# Patient Record
Sex: FEMALE | Race: WHITE
[De-identification: names, ages, dates, MRNs, and addresses within clinical notes are randomized per-mention and may not be internally consistent; named-entity substitution may affect disease eponyms.]

---

## 2020-02-14 ENCOUNTER — HOSPITAL ENCOUNTER (OUTPATIENT)
Dept: HOSPITAL 96 - M.WC | Age: 60
End: 2020-02-14
Attending: FAMILY MEDICINE
Payer: COMMERCIAL

## 2020-02-14 DIAGNOSIS — L98.412: ICD-10-CM

## 2020-02-14 DIAGNOSIS — L89.323: ICD-10-CM

## 2020-02-14 DIAGNOSIS — E66.01: ICD-10-CM

## 2020-02-14 DIAGNOSIS — E11.40: ICD-10-CM

## 2020-02-14 DIAGNOSIS — L97.422: ICD-10-CM

## 2020-02-14 DIAGNOSIS — L89.623: ICD-10-CM

## 2020-02-14 DIAGNOSIS — I10: ICD-10-CM

## 2020-02-14 DIAGNOSIS — K21.9: ICD-10-CM

## 2020-02-14 DIAGNOSIS — F32.9: ICD-10-CM

## 2020-02-14 DIAGNOSIS — E11.621: Primary | ICD-10-CM

## 2020-02-14 DIAGNOSIS — Z87.891: ICD-10-CM

## 2020-02-14 DIAGNOSIS — G47.00: ICD-10-CM

## 2020-02-27 ENCOUNTER — HOSPITAL ENCOUNTER (OUTPATIENT)
Dept: HOSPITAL 96 - M.WC | Age: 60
End: 2020-02-27
Attending: FAMILY MEDICINE
Payer: COMMERCIAL

## 2020-02-27 DIAGNOSIS — E11.622: ICD-10-CM

## 2020-02-27 DIAGNOSIS — L98.412: ICD-10-CM

## 2020-02-27 DIAGNOSIS — L89.623: ICD-10-CM

## 2020-02-27 DIAGNOSIS — E66.01: ICD-10-CM

## 2020-02-27 DIAGNOSIS — L89.323: ICD-10-CM

## 2020-02-27 DIAGNOSIS — E11.621: Primary | ICD-10-CM

## 2020-02-27 DIAGNOSIS — E11.40: ICD-10-CM

## 2020-02-27 DIAGNOSIS — K21.9: ICD-10-CM

## 2020-02-27 DIAGNOSIS — Z87.891: ICD-10-CM

## 2020-02-27 DIAGNOSIS — L97.421: ICD-10-CM

## 2020-02-27 DIAGNOSIS — I10: ICD-10-CM

## 2020-02-27 DIAGNOSIS — F32.9: ICD-10-CM

## 2020-02-27 DIAGNOSIS — G47.00: ICD-10-CM

## 2020-03-12 ENCOUNTER — HOSPITAL ENCOUNTER (OUTPATIENT)
Dept: HOSPITAL 96 - M.WC | Age: 60
End: 2020-03-12
Attending: FAMILY MEDICINE
Payer: COMMERCIAL

## 2020-03-12 DIAGNOSIS — K21.9: ICD-10-CM

## 2020-03-12 DIAGNOSIS — L89.623: ICD-10-CM

## 2020-03-12 DIAGNOSIS — L98.412: ICD-10-CM

## 2020-03-12 DIAGNOSIS — E11.622: ICD-10-CM

## 2020-03-12 DIAGNOSIS — E11.621: Primary | ICD-10-CM

## 2020-03-12 DIAGNOSIS — I10: ICD-10-CM

## 2020-03-12 DIAGNOSIS — E11.40: ICD-10-CM

## 2020-03-12 DIAGNOSIS — E66.01: ICD-10-CM

## 2020-03-12 DIAGNOSIS — G47.00: ICD-10-CM

## 2020-03-12 DIAGNOSIS — F32.9: ICD-10-CM

## 2020-03-12 DIAGNOSIS — L89.323: ICD-10-CM

## 2020-03-12 DIAGNOSIS — L97.422: ICD-10-CM

## 2020-03-26 ENCOUNTER — HOSPITAL ENCOUNTER (OUTPATIENT)
Dept: HOSPITAL 96 - M.WC | Age: 60
End: 2020-03-26
Attending: FAMILY MEDICINE
Payer: COMMERCIAL

## 2020-03-26 DIAGNOSIS — L97.422: ICD-10-CM

## 2020-03-26 DIAGNOSIS — L89.623: ICD-10-CM

## 2020-03-26 DIAGNOSIS — L98.412: ICD-10-CM

## 2020-03-26 DIAGNOSIS — E11.40: ICD-10-CM

## 2020-03-26 DIAGNOSIS — E11.621: Primary | ICD-10-CM

## 2020-03-26 DIAGNOSIS — I10: ICD-10-CM

## 2020-03-26 DIAGNOSIS — K21.9: ICD-10-CM

## 2020-03-26 DIAGNOSIS — E66.01: ICD-10-CM

## 2020-03-26 DIAGNOSIS — Z87.891: ICD-10-CM

## 2020-03-26 DIAGNOSIS — F32.9: ICD-10-CM

## 2020-03-26 DIAGNOSIS — L89.323: ICD-10-CM

## 2020-03-26 DIAGNOSIS — E11.622: ICD-10-CM

## 2020-03-26 DIAGNOSIS — G47.00: ICD-10-CM

## 2020-04-16 ENCOUNTER — HOSPITAL ENCOUNTER (OUTPATIENT)
Dept: HOSPITAL 96 - M.WC | Age: 60
End: 2020-04-16
Attending: FAMILY MEDICINE
Payer: COMMERCIAL

## 2020-04-16 DIAGNOSIS — F32.9: ICD-10-CM

## 2020-04-16 DIAGNOSIS — E11.40: ICD-10-CM

## 2020-04-16 DIAGNOSIS — L98.411: ICD-10-CM

## 2020-04-16 DIAGNOSIS — K21.9: ICD-10-CM

## 2020-04-16 DIAGNOSIS — L89.323: ICD-10-CM

## 2020-04-16 DIAGNOSIS — Z87.891: ICD-10-CM

## 2020-04-16 DIAGNOSIS — E11.622: ICD-10-CM

## 2020-04-16 DIAGNOSIS — E66.01: ICD-10-CM

## 2020-04-16 DIAGNOSIS — I10: ICD-10-CM

## 2020-04-16 DIAGNOSIS — G47.00: ICD-10-CM

## 2020-04-16 DIAGNOSIS — L97.422: ICD-10-CM

## 2020-04-16 DIAGNOSIS — E11.621: Primary | ICD-10-CM

## 2020-04-16 DIAGNOSIS — L89.623: ICD-10-CM

## 2020-04-16 LAB
ANION GAP SERPL CALC-SCNC: 7 MMOL/L (ref 7–16)
BUN SERPL-MCNC: 27 MG/DL (ref 7–18)
CALCIUM SERPL-MCNC: 8.4 MG/DL (ref 8.5–10.1)
CHLORIDE SERPL-SCNC: 103 MMOL/L (ref 98–107)
CO2 SERPL-SCNC: 31 MMOL/L (ref 21–32)
CREAT SERPL-MCNC: 1 MG/DL (ref 0.6–1.3)
GLUCOSE SERPL-MCNC: 99 MG/DL (ref 70–99)
POTASSIUM SERPL-SCNC: 4.1 MMOL/L (ref 3.5–5.1)
SODIUM SERPL-SCNC: 141 MMOL/L (ref 136–145)

## 2020-04-22 VITALS — DIASTOLIC BLOOD PRESSURE: 52 MMHG | SYSTOLIC BLOOD PRESSURE: 100 MMHG

## 2020-04-23 ENCOUNTER — HOSPITAL ENCOUNTER (INPATIENT)
Dept: HOSPITAL 96 - M.ERS | Age: 60
LOS: 4 days | Discharge: HOME HEALTH | DRG: 871 | End: 2020-04-27
Attending: INTERNAL MEDICINE | Admitting: INTERNAL MEDICINE
Payer: COMMERCIAL

## 2020-04-23 VITALS — WEIGHT: 293 LBS | HEIGHT: 64.02 IN | BODY MASS INDEX: 50.02 KG/M2

## 2020-04-23 VITALS — SYSTOLIC BLOOD PRESSURE: 112 MMHG | DIASTOLIC BLOOD PRESSURE: 49 MMHG

## 2020-04-23 VITALS — SYSTOLIC BLOOD PRESSURE: 135 MMHG | DIASTOLIC BLOOD PRESSURE: 48 MMHG

## 2020-04-23 VITALS — SYSTOLIC BLOOD PRESSURE: 109 MMHG | DIASTOLIC BLOOD PRESSURE: 41 MMHG

## 2020-04-23 VITALS — SYSTOLIC BLOOD PRESSURE: 100 MMHG | DIASTOLIC BLOOD PRESSURE: 52 MMHG

## 2020-04-23 DIAGNOSIS — J96.01: ICD-10-CM

## 2020-04-23 DIAGNOSIS — R65.21: ICD-10-CM

## 2020-04-23 DIAGNOSIS — Z20.828: ICD-10-CM

## 2020-04-23 DIAGNOSIS — I87.8: ICD-10-CM

## 2020-04-23 DIAGNOSIS — Z79.84: ICD-10-CM

## 2020-04-23 DIAGNOSIS — J18.9: ICD-10-CM

## 2020-04-23 DIAGNOSIS — R59.1: ICD-10-CM

## 2020-04-23 DIAGNOSIS — L97.929: ICD-10-CM

## 2020-04-23 DIAGNOSIS — A40.0: Primary | ICD-10-CM

## 2020-04-23 DIAGNOSIS — E87.1: ICD-10-CM

## 2020-04-23 DIAGNOSIS — E86.0: ICD-10-CM

## 2020-04-23 DIAGNOSIS — K80.20: ICD-10-CM

## 2020-04-23 DIAGNOSIS — I10: ICD-10-CM

## 2020-04-23 DIAGNOSIS — F41.9: ICD-10-CM

## 2020-04-23 DIAGNOSIS — N17.0: ICD-10-CM

## 2020-04-23 DIAGNOSIS — Z79.4: ICD-10-CM

## 2020-04-23 DIAGNOSIS — L03.116: ICD-10-CM

## 2020-04-23 DIAGNOSIS — G92: ICD-10-CM

## 2020-04-23 DIAGNOSIS — E46: ICD-10-CM

## 2020-04-23 DIAGNOSIS — E87.8: ICD-10-CM

## 2020-04-23 DIAGNOSIS — F17.210: ICD-10-CM

## 2020-04-23 DIAGNOSIS — E11.65: ICD-10-CM

## 2020-04-23 DIAGNOSIS — E11.51: ICD-10-CM

## 2020-04-23 DIAGNOSIS — E55.9: ICD-10-CM

## 2020-04-23 DIAGNOSIS — E66.01: ICD-10-CM

## 2020-04-23 DIAGNOSIS — Z88.5: ICD-10-CM

## 2020-04-23 LAB
ABSOLUTE MONOCYTES: 0.4 THOU/UL (ref 0–1.2)
ALBUMIN SERPL-MCNC: 2.9 G/DL (ref 3.4–5)
ALP SERPL-CCNC: 110 U/L (ref 46–116)
ALT SERPL-CCNC: 33 U/L (ref 30–65)
ANION GAP SERPL CALC-SCNC: 6 MMOL/L (ref 7–16)
APTT BLD: 33.4 SECONDS (ref 25–31.3)
AST SERPL-CCNC: 30 U/L (ref 15–37)
BACTERIA-REFLEX: (no result) /HPF
BE: -1.8 MMOL/L
BILIRUB SERPL-MCNC: 0.5 MG/DL
BILIRUB UR-MCNC: NEGATIVE MG/DL
BUN SERPL-MCNC: 43 MG/DL (ref 7–18)
CALCIUM SERPL-MCNC: 8.9 MG/DL (ref 8.5–10.1)
CHLORIDE SERPL-SCNC: 96 MMOL/L (ref 98–107)
CO2 SERPL-SCNC: 30 MMOL/L (ref 21–32)
COLOR UR: YELLOW
CREAT SERPL-MCNC: 1.7 MG/DL (ref 0.6–1.3)
GLUCOSE SERPL-MCNC: 114 MG/DL (ref 70–99)
GRANULOCYTES NFR BLD MANUAL: 89 %
HCT VFR BLD CALC: 31.4 % (ref 37–47)
HGB BLD-MCNC: 9.9 GM/DL (ref 12–15)
INR PPP: 1.1
KETONES UR STRIP-MCNC: NEGATIVE MG/DL
LYMPHOCYTES # BLD: 2.2 THOU/UL (ref 0.8–5.3)
LYMPHOCYTES NFR BLD AUTO: 6 %
MCH RBC QN AUTO: 23.4 PG (ref 26–34)
MCHC RBC AUTO-ENTMCNC: 31.5 G/DL (ref 28–37)
MCV RBC: 74.4 FL (ref 80–100)
MONOCYTES NFR BLD: 1 %
MPV: 7.9 FL. (ref 7.2–11.1)
MUCUS: (no result) STRN/LPF
NEUTROPHILS # BLD: 34.2 THOU/UL (ref 1.6–8.1)
NEUTS BAND NFR BLD: 4 %
NUCLEATED RBCS: 0 /100WBC
PCO2 BLD: 43.7 MMHG (ref 35–45)
PLATELET # BLD EST: ADEQUATE 10*3/UL
PLATELET COUNT*: 307 THOU/UL (ref 150–400)
PO2 BLD: 107.5 MMHG (ref 75–100)
POTASSIUM SERPL-SCNC: 4.6 MMOL/L (ref 3.5–5.1)
PROT SERPL-MCNC: 8.1 G/DL (ref 6.4–8.2)
PROT UR QL STRIP: (no result)
PROTHROMBIN TIME: 11.4 SECONDS (ref 9.2–11.5)
RBC # BLD AUTO: 4.22 MIL/UL (ref 4.2–5)
RBC # UR STRIP: (no result) /UL
RBC #/AREA URNS HPF: (no result) /HPF (ref 0–2)
RBC MORPH BLD: NORMAL
RDW-CV: 18.8 % (ref 10.5–14.5)
SODIUM SERPL-SCNC: 132 MMOL/L (ref 136–145)
SP GR UR STRIP: 1.01 (ref 1–1.03)
SQUAMOUS: (no result) /LPF (ref 0–3)
URINE CHLORIDE-RANDOM*: 82 MMOL/L
URINE CLARITY: CLEAR
URINE GLUCOSE-RANDOM: NEGATIVE
URINE LEUKOCYTES-REFLEX: NEGATIVE
URINE NITRITE-REFLEX: NEGATIVE
URINE POTASSIUM-RANDOM: 93.7 MMOL/L
URINE WBC-REFLEX: (no result) /HPF (ref 0–5)
UROBILINOGEN UR STRIP-ACNC: 0.2 E.U./DL (ref 0.2–1)
WBC # BLD AUTO: 36.8 THOU/UL (ref 4–11)

## 2020-04-23 PROCEDURE — 02HV33Z INSERTION OF INFUSION DEVICE INTO SUPERIOR VENA CAVA, PERCUTANEOUS APPROACH: ICD-10-PCS | Performed by: INTERNAL MEDICINE

## 2020-04-23 NOTE — NUR
WOUND NURSE:  PATIENT SEEN FOR INITIAL ASSESSMENT IN ACUTE SETTING TO ADDRESS
LEFT HIP STAGE 4 PRESSURE INJURY THAT HAD SUBSEQUENTLY BEEN TREATED IN [facility]  TODAY THE WOUND MEASURESW 2.5 X 7.5 X 9.0  CM AND GOES TO BONE.  WOUND
PRESENTS WITH SLIMY PINK DRAINAGE IN LARGE AMOUNTS, PERIWOUND REDNESS AND
MACERATION.  TENDER TO TOUCH.  PATIETN WAS PREVIOUSLY USING WOUND VAC, BUT
DRESSING IS NOT INTACT.  CLEANSED WITH SOAP AND WATER, RINSED, PATTED DRY.
APPLIED PHYTOPLEX AF MOISTURE BARRIER TO EXCORIATED AND MACERATED TISSUE.
LIGHTLY PACKED WOUND TO IT'S DEPTH USING AQUACEL AG, COVERED WITH ADDITIONAL
AQUACEL AG UNDER ABD'S, THEN SECURED WITH TAPE.  LEFT HEEL STAGE 3 PRESSURE
INJURY MEASURES 2.5 X 2.5 X 1.0 CM AND CONTAINS PINK GRANULATION TISSUE WITH
MODERATE AMOUNT OF SEROUSANGUINOUS DRAINAGE.  PATIENT NEEDS LOW AIRLOSS
BARIATRIC MATTRESS AND NURSINGG SUPERVISOR TO OBTAIN THIS FOR PATIENT.  LEFT
HEEL WOUND WAS CLEANSED WITH SOAP AND WATER, RINSED, THEN PATTED DRY.  APPLIED
AQUACEL AG UNDER ABD, THEN WRAPPED WITH KERLEX ROLL GAUZE AND SECURED WITH
TAPE.  HEELS PLACED OFF MATTRESS USING PILLOWS.  PATIENT IS NOT TEACHEABLE AT
THIS TIME.

## 2020-04-23 NOTE — EKG
[place] [address]
Phone:  [phone]                     ELECTROCARDIOGRAM REPORT      
_______________________________________________________________________________
 
Name:         GRISELDA COON            Room:          [place] [address]   ADM IN 
..#:    [id number]     Account #:     [id number]  
Admission:    20    Attend Phys:   Dino Noland, 
Discharge:                Date of Birth: [birth year]  
Date of Service: 20 1019  Report #:      [id number]
        [id number]
_______________________________________________________________________________
THIS REPORT FOR:  //name//                      
 
                         [facility] ED
                                       
Test Date:    2020               Test Time:    10:19:40
Pat Name:     GRISELDA COON         Department:   
Patient ID:   [id number]            Room:         [facility]
Gender:       F                        Technician:   TS
:          1960               Requested By: Darien Oden
Order Number: [id number] MD:   Kyle Goldstein
                                 Measurements
Intervals                              Axis          
Rate:         108                      P:            0
OR:           47                       QRS:          -64
QRSD:         116                      T:            
QT:           323                                    
QTc:          433                                    
                           Interpretive Statements
Sinus tachycardia
Incomplete right bundle branch block
Inferior infarct, old
artifact noted
Compared to ECG 2019 00:55:22
 
 
 
Atrial premature complex(es) no longer present
 
Electronically Signed On 2020 13:31:49 CDT by Kyle Goldstein
[email or web address]
 
 
 
 
 
 
 
 
 
 
 
 
 
 
  <ELECTRONICALLY SIGNED>
                                           By: Kyle Goldstein MD, FACC      
  20     1331
D: 20 1019   _____________________________________
T: 20 1019   Kyle Goldstein MD, FAC        /EPI

## 2020-04-23 NOTE — NUR
AT 1200, ASSESSED HER WOUND ON COCCYX AREA; HAS A WOUND VAC IN PLACE BUT THE
SEAL IS BROKEN AROUND THE WOUND AND WOUND VAC IS NOT PROPERLY WORKING. NOTIFIED
DR WADDELL ABOUT WOUND AND WOUND VAC NOT WORKING. WOUND STAGE 2 WITH YELLOW
DRAINAGE NOTED. PT'T SEMAJ AREA IS RED BUT SKIN IS INTACT WITH YEAST PRESENT 
UNDERNEATH PANNUS

## 2020-04-23 NOTE — NUR
CALLED TO ER TO PLACE PICC FOR PT IN NEED OF MULTIPLE ANTIBIOTIC WITH RESP
FAILURE AND POSS COVID 19. SPOKE WITH REVIEW OF RISK AND BENEFITS COMPLETED.
PT VOICED UNDERSTANDING AND AGREED. CONSENT OBTAINED. TRIPLE LUMAN POWER PICC
PLACED TO RIGHT UPPER CEPHALIC. USING ULTRASOUND, MODIFIED SELDINGER TECHNIQUE
INCLUDING MAX BARRIR PRECAUTIONS. GOOD BRISK BLOOD RETURN WITH ASPERATION AND
EASY FLUSH. TOLERATED WELL. UNABLE TO USE SHERLOCK 3CG RELATED TO EQUIPMENT.
CHEST X-RAY OBTAINED AND PER RADIOLOGIST LINE IS APROX.  5CM DEEP. PRIMARY
NURSE RETRACTED LINE 4CM UNDER MY DIRECTION LIMITING ISOLATION
CONTACT/PRECAUTIONS.  NO NEW X-RAY REUQIRED AT THIS TIME. LINE RELEASED FOR
USE.

## 2020-04-24 VITALS — SYSTOLIC BLOOD PRESSURE: 101 MMHG | DIASTOLIC BLOOD PRESSURE: 60 MMHG

## 2020-04-24 VITALS — DIASTOLIC BLOOD PRESSURE: 56 MMHG | SYSTOLIC BLOOD PRESSURE: 113 MMHG

## 2020-04-24 VITALS — SYSTOLIC BLOOD PRESSURE: 107 MMHG | DIASTOLIC BLOOD PRESSURE: 52 MMHG

## 2020-04-24 VITALS — DIASTOLIC BLOOD PRESSURE: 50 MMHG | SYSTOLIC BLOOD PRESSURE: 88 MMHG

## 2020-04-24 VITALS — DIASTOLIC BLOOD PRESSURE: 56 MMHG | SYSTOLIC BLOOD PRESSURE: 96 MMHG

## 2020-04-24 VITALS — SYSTOLIC BLOOD PRESSURE: 109 MMHG | DIASTOLIC BLOOD PRESSURE: 65 MMHG

## 2020-04-24 LAB
ALBUMIN SERPL-MCNC: 2.5 G/DL (ref 3.4–5)
ALP SERPL-CCNC: 91 U/L (ref 46–116)
ALT SERPL-CCNC: 30 U/L (ref 30–65)
ANION GAP SERPL CALC-SCNC: 5 MMOL/L (ref 7–16)
AST SERPL-CCNC: 23 U/L (ref 15–37)
BILIRUB SERPL-MCNC: 0.4 MG/DL
BUN SERPL-MCNC: 45 MG/DL (ref 7–18)
CALCIUM SERPL-MCNC: 8.1 MG/DL (ref 8.5–10.1)
CHLORIDE SERPL-SCNC: 101 MMOL/L (ref 98–107)
CO2 SERPL-SCNC: 29 MMOL/L (ref 21–32)
CREAT SERPL-MCNC: 1.4 MG/DL (ref 0.6–1.3)
GLUCOSE SERPL-MCNC: 157 MG/DL (ref 70–99)
HCT VFR BLD CALC: 29 % (ref 37–47)
HGB BLD-MCNC: 9.3 GM/DL (ref 12–15)
MAGNESIUM SERPL-MCNC: 2.1 MG/DL (ref 1.8–2.4)
MCH RBC QN AUTO: 24.1 PG (ref 26–34)
MCHC RBC AUTO-ENTMCNC: 32.1 G/DL (ref 28–37)
MCV RBC: 75 FL (ref 80–100)
MPV: 7.6 FL. (ref 7.2–11.1)
PLATELET COUNT*: 249 THOU/UL (ref 150–400)
POTASSIUM SERPL-SCNC: 4.4 MMOL/L (ref 3.5–5.1)
PROT SERPL-MCNC: 7.3 G/DL (ref 6.4–8.2)
RBC # BLD AUTO: 3.87 MIL/UL (ref 4.2–5)
RDW-CV: 18.5 % (ref 10.5–14.5)
SODIUM SERPL-SCNC: 135 MMOL/L (ref 136–145)
WBC # BLD AUTO: 29.1 THOU/UL (ref 4–11)

## 2020-04-24 NOTE — NUR
SW called pt room and spoke with pt to complete initial assessment.  Pt
talkative.  Pt explains that she has been at home after dc from [facility]
in rehab facility.  Pt current with [facility] and goes to wound clinic
every other Thursday.  HH nurse visited pt 3 times a week.  Pt says she lives
in apt with her dtr who provides needed assistance and her  still works
outside the home the majority of the time but comes home every once in a while
to give pt dtr a break from caregiving.  pt has RW and cam boot due to drop
foot.  Pt says she ambulates but slowly and still thinks she should look for
wc for outside the home.  Pt insurance does not have benefits for LTAC or SNF.
SW discussed dc plan for home with dtr and HH to resume care. Pt thought she
would stay a couple weeks bc of iv abx but SW explained that would most likely
not need to be the case and if needed, IV abx could be arranged for home and
pt expressed understanding.  SW to continue to follow to assist with safe
dc planning.

## 2020-04-24 NOTE — EKG
[place] [address]
Phone:  [phone]                     ELECTROCARDIOGRAM REPORT      
_______________________________________________________________________________
 
Name:         GRISELDA COON            Room:          [address]    ADM IN 
M.R.#:    [id number]     Account #:     [id number]  
Admission:    20    Attend Phys:   Dino Noland, 
Discharge:                Date of Birth: [birth year]  
Date of Service: 20 0307  Report #:      [id number]
        [id number]
_______________________________________________________________________________
THIS REPORT FOR:  //name//                      
 
                          [facility]
                                       
Test Date:    2020               Test Time:    03:07:49
Pat Name:     GRISELDA COON         Department:   
Patient ID:   [id number]            Room:         [address]
Gender:       F                        Technician:   MS
:          1960               Requested By: Rosana Smith
Order Number: [id number]    Reading MD:   Kyle Goldstein
                                 Measurements
Intervals                              Axis          
Rate:         92                       P:            55
AK:           156                      QRS:          70
QRSD:         96                       T:            33
QT:           361                                    
QTc:          447                                    
                           Interpretive Statements
Sinus rhythm
incomplete RBBB
Probable left atrial enlargement
Baseline wander in lead(s) V6
Compared to ECG 2020 10:19:40
Sinus tachycardia no longer present
 
 
Electronically Signed On 2020 10:55:59 CDT by Kyle Goldstein
[email or web address]
 
 
 
 
 
 
 
 
 
 
 
 
 
 
 
 
  <ELECTRONICALLY SIGNED>
                                           By: Kyle Goldstein MD, [facility]      
  20     1055
D: 207   _____________________________________
T: 207   Kyle Goldstein MD, [facility]        /EPI

## 2020-04-24 NOTE — NUR
ASSUMED PATIENT CARE AT 1900. ASSESSMENT COMPLETED AS CHARTED. PATIENT IS NSR
ON THE MONITOR. HOURLY ROUNDING IN PLACE FOR PATIENT SAFETY. DURING SHIFT
PATIENT REPORTED CHEST PAIN DURING INSPIRATION. EKG PERFORMED, SEE CHART FOR
DETAILS. PATIENT DECLINED PRN PAIN MEDS. DURING SHIFT PATIENT ACCIDENTALLY
DETACHED ONE OF THE PORTS FROM HER PICC LINE AND BLEEDING CAUSED IT TO CLOT
OFF. THIS NURSE WAS CONCERNED THAT SHE HAD ALSO POTENTIALLY DISLODGED HER PICC
LINE. FLUIDS PAUSED, PHYSICIAN WAS NOTIFIED, NEW ORDERS RECEIVED, SEE PATIENT
CHART FOR DETAILS. AWAITING CATH RAMSES, REPORT GIVEN TO DAY-SHIFT NURSE. CLWR.

## 2020-04-25 VITALS — DIASTOLIC BLOOD PRESSURE: 62 MMHG | SYSTOLIC BLOOD PRESSURE: 114 MMHG

## 2020-04-25 VITALS — DIASTOLIC BLOOD PRESSURE: 60 MMHG | SYSTOLIC BLOOD PRESSURE: 110 MMHG

## 2020-04-25 VITALS — DIASTOLIC BLOOD PRESSURE: 62 MMHG | SYSTOLIC BLOOD PRESSURE: 115 MMHG

## 2020-04-25 VITALS — DIASTOLIC BLOOD PRESSURE: 45 MMHG | SYSTOLIC BLOOD PRESSURE: 100 MMHG

## 2020-04-25 VITALS — DIASTOLIC BLOOD PRESSURE: 65 MMHG | SYSTOLIC BLOOD PRESSURE: 118 MMHG

## 2020-04-25 VITALS — SYSTOLIC BLOOD PRESSURE: 105 MMHG | DIASTOLIC BLOOD PRESSURE: 48 MMHG

## 2020-04-25 VITALS — DIASTOLIC BLOOD PRESSURE: 54 MMHG | SYSTOLIC BLOOD PRESSURE: 120 MMHG

## 2020-04-25 LAB
ANION GAP SERPL CALC-SCNC: 6 MMOL/L (ref 7–16)
BUN SERPL-MCNC: 37 MG/DL (ref 7–18)
CALCIUM SERPL-MCNC: 7.9 MG/DL (ref 8.5–10.1)
CHLORIDE SERPL-SCNC: 104 MMOL/L (ref 98–107)
CO2 SERPL-SCNC: 29 MMOL/L (ref 21–32)
CREAT SERPL-MCNC: 1.1 MG/DL (ref 0.6–1.3)
GLUCOSE SERPL-MCNC: 98 MG/DL (ref 70–99)
HCT VFR BLD CALC: 28.1 % (ref 37–47)
HGB BLD-MCNC: 8.9 GM/DL (ref 12–15)
MAGNESIUM SERPL-MCNC: 2.2 MG/DL (ref 1.8–2.4)
MCH RBC QN AUTO: 23.6 PG (ref 26–34)
MCHC RBC AUTO-ENTMCNC: 31.6 G/DL (ref 28–37)
MCV RBC: 74.8 FL (ref 80–100)
MPV: 7.6 FL. (ref 7.2–11.1)
PLATELET COUNT*: 204 THOU/UL (ref 150–400)
POTASSIUM SERPL-SCNC: 4.4 MMOL/L (ref 3.5–5.1)
RBC # BLD AUTO: 3.75 MIL/UL (ref 4.2–5)
RDW-CV: 18.2 % (ref 10.5–14.5)
SODIUM SERPL-SCNC: 139 MMOL/L (ref 136–145)
WBC # BLD AUTO: 18 THOU/UL (ref 4–11)

## 2020-04-25 NOTE — NUR
ASSUMED PATIENT CARE AT 1900. ASSESSMENT COMPLETED AS CHARTED. PATIENT IS NSR
ON THE MONITOR. HOURLY ROUNDING IN PLACE FOR PATIENT SAFETY. CLWR.

## 2020-04-25 NOTE — NUR
ASSUMED PT CARE AT 0700, PT A&O X4, VSS, REMAINS ON 3LPM VIA NC, ENCOURAGED TO
GET OUT OF BED AND SIT IN RECLINER, PT REFUSED. MED SURG STATUS, LS DIMINISHED
THROUGHOUT. AIKEN PATENT AND DRAINING DARK YELLOW URINE, WOUND DRESSINGS
CHANGED, CLEAN, DRY, AND INTACT. PICC LINE INTACT AND EACH LUMEN PATENT, PT
REMAINS ON CONTACT ISOLATION FOR HX OF MRSA IN WOUNDS, HOURLY ROUNDING
COMPLETED.

## 2020-04-26 VITALS — DIASTOLIC BLOOD PRESSURE: 64 MMHG | SYSTOLIC BLOOD PRESSURE: 112 MMHG

## 2020-04-26 VITALS — DIASTOLIC BLOOD PRESSURE: 96 MMHG | SYSTOLIC BLOOD PRESSURE: 125 MMHG

## 2020-04-26 VITALS — SYSTOLIC BLOOD PRESSURE: 118 MMHG | DIASTOLIC BLOOD PRESSURE: 61 MMHG

## 2020-04-26 LAB
ANION GAP SERPL CALC-SCNC: 6 MMOL/L (ref 7–16)
BUN SERPL-MCNC: 26 MG/DL (ref 7–18)
CALCIUM SERPL-MCNC: 8 MG/DL (ref 8.5–10.1)
CHLORIDE SERPL-SCNC: 105 MMOL/L (ref 98–107)
CO2 SERPL-SCNC: 31 MMOL/L (ref 21–32)
CREAT SERPL-MCNC: 0.9 MG/DL (ref 0.6–1.3)
GLUCOSE SERPL-MCNC: 101 MG/DL (ref 70–99)
MAGNESIUM SERPL-MCNC: 1.9 MG/DL (ref 1.8–2.4)
POTASSIUM SERPL-SCNC: 4.7 MMOL/L (ref 3.5–5.1)
SODIUM SERPL-SCNC: 142 MMOL/L (ref 136–145)

## 2020-04-26 NOTE — NUR
PT IS ABLE TO COMMUNCATE HER NEEDS TO STAFF EFFECTIVELY. CURRENT PAIN
MEDICATION REGIMEN HAS BEEN ADEQUATE FOR CONTROLLING HER PAIN UP TO THIS TIME.
PT'S HEELS ELEVATED OFF BED DURING THE NIGHT. DRESSINGS TO WOUNDS INTACT AND
REINFORCED WHERE NEEDED. AIKEN PATENT UP TO THIS TIME; GOOD OUTPUT. CONTACT
ISOLATION FOR MRSA MAINTAINED. PICC LINE PATENT.

## 2020-04-26 NOTE — NUR
ASSUMED PT CARE AT 0700, VSS, REMAINS ON 2.5LPM VIA NC, TITRATED DOWN FROM
3LPM, REMAINS MED/SURG STATUS. AIKEN REMOVED PER DR ORDERS, PT EDUCATED ON
IMPORTANCE OF REMOVAL, STATES UNDERSTANDING AND ABLE TO VOID WITH NO
DIFFICULTIES POST CATH. TRIPLE LUMEN PICC PATENT, DRESSING CLEAN, DRY, AND
INTACT, HOURLY ROUNDING COMPLETED.

## 2020-04-27 VITALS — SYSTOLIC BLOOD PRESSURE: 134 MMHG | DIASTOLIC BLOOD PRESSURE: 71 MMHG

## 2020-04-27 VITALS — DIASTOLIC BLOOD PRESSURE: 71 MMHG | SYSTOLIC BLOOD PRESSURE: 143 MMHG

## 2020-04-27 VITALS — SYSTOLIC BLOOD PRESSURE: 143 MMHG | DIASTOLIC BLOOD PRESSURE: 71 MMHG

## 2020-04-27 NOTE — CON
[address]  [address]                    CONSULTATION                  
_______________________________________________________________________________
 
Name:       JAYMIEGRISELDA M             Room:           [address] [place]  
M.R.#:  [id number]      Account #:      [id number]  
Admission:  04/23/20     Attend Phys:    Dino Noland MD 
Discharge:               Date of Birth:  [birth year]  
         Report #: [id number]
                                                                     [id number]  
_______________________________________________________________________________
THIS REPORT FOR:  //name//                      
 
cc:  Quentin Rice Russell J. DO                                               ~
THIS REPORT FOR:  //name//                      
 
CC: Dino Rice
 
DATE OF SERVICE:  04/24/2020
 
 
INFECTIOUS DISEASE CONSULTATION
 
ATTENDING PHYSICIAN:  Dino Noland MD
 
REASON FOR EVALUATION:  Gram-positive cocci, septicemia.
 
HISTORY OF PRESENT ILLNESS:  Chart reviewed, the patient examined.  This is a
59-year-old known to myself, who had actually been hospitalized in 11/2019, was
diagnosed with Staphylococcus aureus septicemia, also had wounds with
polymicrobial growth including Enterococcus faecalis and MRSA.  [facility], has multiple ulcers involving her left lower
extremity, perhaps most problematic involving the proximal aspect noted that had
worsened over the course of recent days to weeks, increasing inflammation.  She
was evaluated and underwent a culture in mid February which showed no growth. 
Had undergone an MRI evaluation earlier this week.  Had presented with onset of
fevers, which were high-grade as well as encephalopathy.  Upon evaluation, blood
cultures were collected 1 out of 2 with growth of gram-positive cocci, has a
skin and soft tissue infection cultures as well that are in progress.  Chest
x-ray did show some diffuse bilateral interstitial infiltrates.  A CT abdomen
and pelvis shows some pelvic adenopathy.  Had a marked elevation of her white
count of 36,800.  Urinalysis was otherwise unremarkable.  Coronavirus PCR was
negative.  She was initiated on empiric therapy with vancomycin as well as
levofloxacin and ceftriaxone.
 
ALLERGIES:  LISTED TO CODEINE.
 
CURRENT MEDICATIONS:  Include vancomycin, levofloxacin, gabapentin,
pantoprazole, bupropion, ceftriaxone, p.r.n. analgesics and antiemetics.
 
PAST MEDICAL HISTORY:  Diabetes mellitus type 2, history of anxiety, lower
extremity lymphedema, chronic decubitus ulcers of the left ischial site, left
adrenal adenoma and morbid obesity.
 
SOCIAL HISTORY:  She smokes last 40 years.  No ethanol.  No illicit drug use.
 
 
 
[place] [address]                    CONSULTATION                  
_______________________________________________________________________________
 
Name:       RUTH ANN COONTORRES CARDENAS             Room:           [address]#:  [id number]      Account #:      [id number]  
Admission:  04/23/20     Attend Phys:    Dino Noland MD 
Discharge:               Date of Birth:  [birth year]  
         Report #: [id number]
                                                                     [id number]  
_______________________________________________________________________________
 
FAMILY HISTORY:  Noncontributory.
 
REVIEW OF SYSTEMS:  Denies any significant gastrointestinal-related complaints. 
She is on supplemental oxygen, although not normally, so she is generally lucid.
 
PHYSICAL EXAMINATION:
GENERAL:  She is alert, cooperative, appropriate.  She is in mild-to-moderate
distress.  She is morbidly obese.
VITAL SIGNS:  T-max 103.2 yesterday, more recently 97.9, pulse 93, respirations
22, blood pressure is 88/50 and saturation 98%.
SKIN:  Warm, dry, no rashes.
HEENT:  Normocephalic.  Extraocular muscles intact.
NECK:  Supple.
LUNGS:  Few scattered coarse breath sounds.
HEART:  Regular.  Borderline tachycardic.  I do not appreciate murmur.
ABDOMEN:  Obese, soft, nontender.  Left ischial site has moderate inflammatory
changes.  No apparent odor.  There is some drainage from the site.
EXTREMITIES:  Distal lower extremity has a dressing in place.
 AND RECTAL:  Deferred.
 
LABORATORY DATA:  Lactic acid initially 1.9, repeat this morning was 1.0. 
Electrolytes this morning; sodium 135, potassium 4.4, chloride 101, bicarbonate
is 29, anion gap of 5, BUN and creatinine of 45 and 1.4, and glucose of 157. 
LFTs unremarkable.  Albumin of 285, total protein 7.3, estimated GFR of 39. 
CBC:  White count of 29.1, hemoglobin and hematocrit of 9.3 and 29.0, platelets
of 249.  Blood culture 1 out of 2 with Gram-positive cocci.  Admission
coronavirus PCR was negative.  Influenza antigen was negative.
 
Venous Doppler of lower extremities showed no evidence of DVT.  Chest x-ray with
bilateral interstitial infiltrates.
 
Urinalysis, 0-5 white cells.  ABGs:  pH of 7.353, pCO2 of 43.7 and pO2 of 107.5
on 10 liters.
 
ASSESSMENT AND PLAN:  Gram-positive cocci isolated on blood culture, the patient
with recent history of Staphylococcus aureus septicemia.  We will await results
of the both.  I would suspect most likely source in this setting would be the
wound.  She noted she had an MRI, we will try to track down those results. 
Continue empiric therapy.  I think we can back off a little bit, certainly can
exclude pneumonitis as well.  At this point, she is not overtly toxic.  We will
 
 
 
[address]  [address]                    CONSULTATION                  
_______________________________________________________________________________
 
Name:       GRISELDA COON             Room:           [address] [place]  
..#:  [id number]      Account #:      [id number]  
Admission:  04/23/20     Attend Phys:    Dino Noland MD 
Discharge:               Date of Birth:  [birth year]  
         Report #: [id number]
                                                                     [id number]  
_______________________________________________________________________________
continue current approach including wound care.  Monitor expectantly, certainly
at risk for additional complications.
 
 
 
 
 
 
 
 
 
 
 
 
 
 
 
 
 
 
 
 
 
 
 
 
 
 
 
 
 
 
 
 
 
 
 
 
 
 
 
 
 
 
<ELECTRONICALLY SIGNED>
                                        By:  John Melgar MD           
04/27/20     0736
D: 04/24/20 1035_______________________________________
T: 04/24/20 1134Joraimundo Melgar MD              /nt

## 2020-04-27 NOTE — NUR
PT HOME HEALTH SET UP. IV ABX SET UP. LEFT WITH PICC IN PLACE. PT GIVEN
DISCHARGE INFORMATION. PT BELONGINGS GATHERED. FALL RISK PRECAUTIONS IN PLACE.
HOURLY ROUNDING COMPLETED. ISOLATION MAINTAINED. PT LEFT VIA WHEELCHAIR WITH
NURSING STAFF TO HOME.

## 2020-04-27 NOTE — NUR
Pt discharged to home today. IVABX covered at 100%. Jose Ramonva to contact Pt/dtr
for delivery of meds to home, and to arrange teach. Faxed HH orders to [facility] and
updated them on need for IVABX.

## 2020-04-27 NOTE — NUR
DC orders written, CM faxed referral to [facility] to check IVABX coverage. Picc
in place. Awaiting call with cost of IVABX. CM to fax resumption orders to [facility].

## 2020-05-07 ENCOUNTER — HOSPITAL ENCOUNTER (OUTPATIENT)
Dept: HOSPITAL 96 - M.WC | Age: 60
End: 2020-05-07
Attending: FAMILY MEDICINE
Payer: COMMERCIAL

## 2020-05-07 DIAGNOSIS — K21.9: ICD-10-CM

## 2020-05-07 DIAGNOSIS — E11.40: ICD-10-CM

## 2020-05-07 DIAGNOSIS — L89.323: ICD-10-CM

## 2020-05-07 DIAGNOSIS — E66.01: ICD-10-CM

## 2020-05-07 DIAGNOSIS — E11.621: Primary | ICD-10-CM

## 2020-05-07 DIAGNOSIS — L89.623: ICD-10-CM

## 2020-05-07 DIAGNOSIS — L97.422: ICD-10-CM

## 2020-05-07 DIAGNOSIS — Z79.84: ICD-10-CM

## 2020-05-07 DIAGNOSIS — G47.00: ICD-10-CM

## 2020-05-07 DIAGNOSIS — E11.622: ICD-10-CM

## 2020-05-07 DIAGNOSIS — F32.9: ICD-10-CM

## 2020-05-07 DIAGNOSIS — I10: ICD-10-CM

## 2020-05-07 DIAGNOSIS — L98.412: ICD-10-CM

## 2020-05-07 DIAGNOSIS — Z87.891: ICD-10-CM

## 2020-06-07 ENCOUNTER — HOSPITAL ENCOUNTER (INPATIENT)
Dept: HOSPITAL 96 - M.ERS | Age: 60
LOS: 17 days | Discharge: HOME HEALTH | DRG: 464 | End: 2020-06-24
Attending: INTERNAL MEDICINE | Admitting: INTERNAL MEDICINE
Payer: COMMERCIAL

## 2020-06-07 VITALS — DIASTOLIC BLOOD PRESSURE: 59 MMHG | SYSTOLIC BLOOD PRESSURE: 130 MMHG

## 2020-06-07 VITALS — DIASTOLIC BLOOD PRESSURE: 68 MMHG | SYSTOLIC BLOOD PRESSURE: 117 MMHG

## 2020-06-07 VITALS — SYSTOLIC BLOOD PRESSURE: 128 MMHG | DIASTOLIC BLOOD PRESSURE: 72 MMHG

## 2020-06-07 VITALS — BODY MASS INDEX: 50.02 KG/M2 | WEIGHT: 293 LBS | HEIGHT: 64.02 IN

## 2020-06-07 VITALS — DIASTOLIC BLOOD PRESSURE: 80 MMHG | SYSTOLIC BLOOD PRESSURE: 130 MMHG

## 2020-06-07 DIAGNOSIS — B95.62: ICD-10-CM

## 2020-06-07 DIAGNOSIS — S93.05XA: ICD-10-CM

## 2020-06-07 DIAGNOSIS — E11.22: ICD-10-CM

## 2020-06-07 DIAGNOSIS — E11.40: ICD-10-CM
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
## 2020-06-07 DIAGNOSIS — N18.3: ICD-10-CM

## 2020-06-07 DIAGNOSIS — Z87.891: ICD-10-CM

## 2020-06-07 DIAGNOSIS — E11.621: ICD-10-CM

## 2020-06-07 DIAGNOSIS — E66.01: ICD-10-CM

## 2020-06-07 DIAGNOSIS — L97.929: ICD-10-CM

## 2020-06-07 DIAGNOSIS — M14.672: Primary | ICD-10-CM
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
## 2020-06-07 DIAGNOSIS — L03.116: ICD-10-CM

## 2020-06-07 DIAGNOSIS — Z88.6: ICD-10-CM

## 2020-06-07 DIAGNOSIS — B95.8: ICD-10-CM

## 2020-06-07 DIAGNOSIS — F41.9: ICD-10-CM

## 2020-06-07 DIAGNOSIS — T50.905A: ICD-10-CM
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
## 2020-06-07 DIAGNOSIS — I95.2: ICD-10-CM

## 2020-06-07 LAB
%HYPO/RBC NFR BLD AUTO: (no result) %
ABSOLUTE EOSINOPHILS: 0.3 THOU/UL (ref 0–0.7)
ABSOLUTE MONOCYTES: 0.4 THOU/UL (ref 0–1.2)
ALBUMIN SERPL-MCNC: 2.4 G/DL (ref 3.4–5)
ALP SERPL-CCNC: 121 U/L (ref 46–116)
ALT SERPL-CCNC: 34 U/L (ref 30–65)
ANION GAP SERPL CALC-SCNC: 4 MMOL/L (ref 7–16)
ANISOCYTOSIS BLD QL SMEAR: (no result)
APTT BLD: 28.7 SECONDS (ref 25–31.3)
AST SERPL-CCNC: 22 U/L (ref 15–37)
BILIRUB SERPL-MCNC: 0.3 MG/DL
BILIRUB UR-MCNC: NEGATIVE MG/DL
BUN SERPL-MCNC: 29 MG/DL (ref 7–18)
CALCIUM SERPL-MCNC: 8.3 MG/DL (ref 8.5–10.1)
CHLORIDE SERPL-SCNC: 99 MMOL/L (ref 98–107)
CO2 SERPL-SCNC: 32 MMOL/L (ref 21–32)
COLOR UR: YELLOW
CREAT SERPL-MCNC: 1.2 MG/DL (ref 0.6–1.3)
EOSINOPHIL NFR BLD: 2 %
GLUCOSE SERPL-MCNC: 107 MG/DL (ref 70–99)
GRANULOCYTES NFR BLD MANUAL: 87 %
HCT VFR BLD CALC: 28.6 % (ref 37–47)
HGB BLD-MCNC: 8.9 GM/DL (ref 12–15)
INR PPP: 1.1
KETONES UR STRIP-MCNC: NEGATIVE MG/DL
LYMPHOCYTES # BLD: 1.1 THOU/UL (ref 0.8–5.3)
LYMPHOCYTES NFR BLD AUTO: 8 %
MCH RBC QN AUTO: 22.2 PG (ref 26–34)
MCHC RBC AUTO-ENTMCNC: 31.3 G/DL (ref 28–37)
MCV RBC: 71 FL (ref 80–100)
MONOCYTES NFR BLD: 3 %
MPV: 7.1 FL. (ref 7.2–11.1)
NEUTROPHILS # BLD: 12.2 THOU/UL (ref 1.6–8.1)
NUCLEATED RBCS: 0 /100WBC
PLATELET # BLD EST: ADEQUATE 10*3/UL
PLATELET COUNT*: 319 THOU/UL (ref 150–400)
POIKILOCYTOSIS BLD QL SMEAR: (no result)
POLYCHROMASIA BLD QL SMEAR: (no result)
POTASSIUM SERPL-SCNC: 4 MMOL/L (ref 3.5–5.1)
PROT SERPL-MCNC: 8.8 G/DL (ref 6.4–8.2)
PROT UR QL STRIP: NEGATIVE
PROTHROMBIN TIME: 10.9 SECONDS (ref 9.2–11.5)
RBC # BLD AUTO: 4.02 MIL/UL (ref 4.2–5)
RBC # UR STRIP: NEGATIVE /UL
RDW-CV: 19.4 % (ref 10.5–14.5)
SODIUM SERPL-SCNC: 135 MMOL/L (ref 136–145)
SP GR UR STRIP: 1.01 (ref 1–1.03)
URINE CLARITY: CLEAR
URINE GLUCOSE-RANDOM: NEGATIVE
URINE LEUKOCYTES-REFLEX: NEGATIVE
URINE NITRITE-REFLEX: NEGATIVE
UROBILINOGEN UR STRIP-ACNC: 0.2 E.U./DL (ref 0.2–1)
WBC # BLD AUTO: 14 THOU/UL (ref 4–11)

## 2020-06-07 NOTE — NUR
PATIENT ARRIVED TO UNIT AT APPROX 1633. ALERT AND ORIENTED X4. ADMISSION
HISTORU AND ASSESSMENT COMPLETED AND CHARTED. VSS ON ROOM AIR. PATIENT HAD A
SPASM IN HER RIGHT LED JUST AFTER ADMISSION, I TRIED TO STRETCH AND MASSAGE
HER LEG TO EASE HER PAIN AS SHE WAS CRYING OUT. DR DUNCAN WAS PAGED AND
ORDERED FLEXERIL AND NORCO, BOTH GIVEN AND PAIN SUBSIDED. WOUNDS NOTED TO
PATIENTS BOTTOM AND LEFT ANKLE. CELLULITIS NOTED TO PATIENTS LEFT LOWER
EXTREMITY. PHOTOS TAKEN AND PLACED IN CHART. PUREWICK IN PLACE AND DRAINING TO
SUCTION. FALL PRECAUTIONS IN PLACE. CALL LIGHT WITHIN REACH. HOURLY ROUNDS
COMPLETED. WILL CONTINUE WITH PLAN OF CARE.

## 2020-06-08 VITALS — SYSTOLIC BLOOD PRESSURE: 121 MMHG | DIASTOLIC BLOOD PRESSURE: 65 MMHG

## 2020-06-08 VITALS — DIASTOLIC BLOOD PRESSURE: 54 MMHG | SYSTOLIC BLOOD PRESSURE: 114 MMHG

## 2020-06-08 LAB
ABSOLUTE BASOPHILS: 0 THOU/UL (ref 0–0.2)
ABSOLUTE EOSINOPHILS: 0.2 THOU/UL (ref 0–0.7)
ABSOLUTE MONOCYTES: 0.7 THOU/UL (ref 0–1.2)
ANION GAP SERPL CALC-SCNC: 3 MMOL/L (ref 7–16)
BASOPHILS NFR BLD AUTO: 0.3 %
BUN SERPL-MCNC: 20 MG/DL (ref 7–18)
CALCIUM SERPL-MCNC: 8.6 MG/DL (ref 8.5–10.1)
CHLORIDE SERPL-SCNC: 102 MMOL/L (ref 98–107)
CO2 SERPL-SCNC: 33 MMOL/L (ref 21–32)
CREAT SERPL-MCNC: 0.9 MG/DL (ref 0.6–1.3)
EOSINOPHIL NFR BLD: 1.8 %
GLUCOSE SERPL-MCNC: 109 MG/DL (ref 70–99)
GRANULOCYTES NFR BLD MANUAL: 78.8 %
HCT VFR BLD CALC: 29.5 % (ref 37–47)
HGB BLD-MCNC: 9.3 GM/DL (ref 12–15)
LYMPHOCYTES # BLD: 1.3 THOU/UL (ref 0.8–5.3)
LYMPHOCYTES NFR BLD AUTO: 12.3 %
MCH RBC QN AUTO: 22.2 PG (ref 26–34)
MCHC RBC AUTO-ENTMCNC: 31.4 G/DL (ref 28–37)
MCV RBC: 70.8 FL (ref 80–100)
MONOCYTES NFR BLD: 6.8 %
MPV: 7 FL. (ref 7.2–11.1)
NEUTROPHILS # BLD: 8.4 THOU/UL (ref 1.6–8.1)
NUCLEATED RBCS: 0 /100WBC
PLATELET COUNT*: 294 THOU/UL (ref 150–400)
POTASSIUM SERPL-SCNC: 4.1 MMOL/L (ref 3.5–5.1)
RBC # BLD AUTO: 4.17 MIL/UL (ref 4.2–5)
RDW-CV: 19.3 % (ref 10.5–14.5)
SODIUM SERPL-SCNC: 138 MMOL/L (ref 136–145)
WBC # BLD AUTO: 10.7 THOU/UL (ref 4–11)

## 2020-06-08 NOTE — NUR
PATIENT IS MAX ASSIST. ATTEMPTED TO PLACE PUREWICK SEVERAL TIMES. IT DOES HAVE
SUCTION ON AND WORKING THIS AM.
2 BED CHANGES DUE TO INCONTINENCE. WOUND CARE CONSULTED, PICTURES IN CHART.
WOUNDS ARE ON LEFT ANKLE AND UPPER LEFT BUTTOCKS. THE LEFT LOWER EXTREMETY IS
WHEEPING PURULENT FLUID AND EDEMOUS AND RED. SHE DID NOT REPORT ANY PAIN THIS
MORNING. VANCOMYCIN STARTED AROUND 0445. PHARMACY ADVISED TO RUN 2 BAGS FOR
STARTING DOSE OF 2 GRAMS. SHE HAS A VANC TROUGH AT 1200 TODAY. PLAN IS TO
CONSULT WITH PODIATRY AND WOUND CARE. WILL CONTINUE TO MONITOR.

## 2020-06-08 NOTE — NUR
PATIENT HAS A PRESSURE WOUND ON THE LEFT BUTTOCK AND HEEL. WOUND CARE CAME
TODAY AND PLACED NEW DRESSINGS ON THEM. SHE IS ON IV ANTIBIOTICS AND HER LEFT
LEG IS VERY EDEMATOUS AND RED. HER LEFT ANKLE IS DISLOCATED AND IT IS NOT KNOW
HOW THIS HAPPENED OR WHEN. I DID HAVE TO REPLACE HER IV IN THE LEFT FOREARM
BECAUSE THE OTHER ONE WAS RED AND SORE. IT IS SL AT THIS TIME. SHE HAD 2 EXTRA
LARGE BOWEL MOVEMENTS TODAY AND I DID HAVE TO PLACE A AIKEN CATH BECAUSE SHE
WAS VOIDING SO MUCH AND WAS INCONT. OF URINE AND THE PACKING IN THE LEFT
BUTTOCK WOUND WAS STAYING SATURATED. FALL PRECAUTIONS ARE MAINTAINED PATIENT
IS VERY OBESE AND DOES NOT TRY TO GET UP WITHOUT HELP. SHE HAS RESTED WELL
THIS AFTERNOON AND IS JUST WAITING FOR HER SUPPER AT THIS TIME.

## 2020-06-08 NOTE — NUR
Pt out of room for US of legs when SW went to visit with pt for initial
assessment.  Pt known to SW from previous hospitalization.  Pt lived with dtr
and pt  works outside of the home and is rarely available.  Pt has
history of inpt rehab at [facility]. Hx HH with [facility] and current needs for
wound care OP but was unable to get to appts easily due to obesity and pt dtr
unable to continue to provide for pt needs according to wound care nurse
Tristin.  pt has hx of iv abx with [facility].  Pt insurance does not have any
benefits for SNF or ltach so this will be a dc/placement barrier.  SW to refer
to [employer] to determine if pt could be eligible for Medicaid although pt has
previously stated that her  makes too much money for her to qualify for
Medicaid.  Pt has RW, cam boot.  SW to follow to discuss LTC options if
pt/family ability to determine finances towards LTC or more in home care as
needed.  Pt is 59 and would also require bariatric equipment which are 2 more
dc barriers for placement.

## 2020-06-08 NOTE — NUR
PATIENT HAS BEEN ON THE PHONE WITH HER  UPDATING HIM FREQUENTLY. I ASK
HER IF SHE WANTED ME TO ANSWER ANY QUESTIONS FOR HIM AND SHE STATED NO.

## 2020-06-08 NOTE — NUR
WOUND NURSE:  PATIENT SEEN TODAY TO ADDRESS LEFT BUTTOCK WOUND AND LEFT
LATERAL MALLEOLAR WOUND. LEFT BUTTOCK WOUND PRESENTS AS A NARROW DEEP TUNNEL,
NEAR THE ISCHIUM AND MEASURING 1.0 X 1.0 X 9.2 CM.  PALE PINK GRANULATION
TISSUE IS IN THE WOUND BED, UNABLE TO PALPATE BONE USING COTTON TIPPED
APPLICATOR.  THERE IS A MODERATE AMOUNT OF SEROUSANGUINOUS DRAINAGE NOTED.
THE PERIWOUND TISSUE PRESENTS WITH RED, MACULAR RASH.  CLEANSED WITH SOAP AND
WATER, RINSED WITH WATER, THEN PATTED DRY.  IRRIGATED THE WOUND BED WITH
NORMAL SALINE SYRINGE.  PACKED LIGHTLY WITH SILVERMED GEL MOISTENED PLAIN
PACKING RIBBON, THEN COVERED WITH ABD AND SECURED WITH PAPER TAPE.  ALSO
APPLIED MOISTURE BARRIER TO THE PERIWOUND TISSUE.  THIS WAS TOLERATED WELL BY
THE PATIENT.  THIS WOUND WAS PREVIOUSLY STAGED AS 3 WHEN [place] [facility]  NEW WOUND PRESENT ON THE LEFT LATERAL MALLOLUS MEASURING 2.3 X 4.5 X 0.1
CM.  CONTAINS LIGHT YELLOW ESCHAR, SM  AMOUNT OF YELLOW DRAINAGE PRESENT ON
OLD PAD UNDERNEATH WOUND.  THE PERIWOUND AREA IS REDDENED, EDEMATOUS, AND
WARM TO TOUCH.  THIS WAS CLEANSED WITH SOAP AND WATER, RINSED WITH WATER, THEN
PATTED DRY.  APPLIED AQUACEL AG UNDER ABD TO WOUND BED.  USED MOISTURE BARRIER
TO INTACT PERIWOUND SKIN.  WRAPPED WITH KERLEX ROLL GAUZE UNDER ACE WRAP.
THIS WAS TOLERATED WELL BY THE PATIENT.  PATIENT ADMITTING SHE LACKS ADEQUATE
CARE AT HOME, SOMETIMES HOME BY HERSELF AND LEFT IN URINE AND FECES PER MEL CRANE RN -- REPORTED BY THE [facility] NURSE CARING FOR PATIENT.  PATIENT ALSO HAS
MISSED [facility] APPT DUE TO MOBILITY DEFICIT AND 13 STEPS TO LEAVE HOME.  DISCUSSED
WITH PATIENT THE POSSIBILITY SHE NEEDS MORE CARE THAN CAN BE DELIVERED AT HOME
AT THIS TIME.  PATIENT RELUCTANTLY RECEPTIVE TO DISCUSSING WITH 
PERTAINING TO AVAILABLE COMMUNITY RESOURCES.  PATIENT INSTRUCTED ON OFFLOADING
BOTH WOUNDS AND PROTEIN DENSE DIET WITH GOOD BGL CONTROL TO PROMOTE HEALING.
FOLLOW UP INSTRUCTION WILL BE NECESSARY DESPITE PATIENT IS ALERT AND ORIENTED.

## 2020-06-09 VITALS — DIASTOLIC BLOOD PRESSURE: 90 MMHG | SYSTOLIC BLOOD PRESSURE: 114 MMHG

## 2020-06-09 VITALS — SYSTOLIC BLOOD PRESSURE: 103 MMHG | DIASTOLIC BLOOD PRESSURE: 64 MMHG

## 2020-06-09 VITALS — DIASTOLIC BLOOD PRESSURE: 47 MMHG | SYSTOLIC BLOOD PRESSURE: 97 MMHG

## 2020-06-09 LAB
ABSOLUTE BASOPHILS: 0 THOU/UL (ref 0–0.2)
ABSOLUTE EOSINOPHILS: 0.3 THOU/UL (ref 0–0.7)
ABSOLUTE MONOCYTES: 0.6 THOU/UL (ref 0–1.2)
ANION GAP SERPL CALC-SCNC: 4 MMOL/L (ref 7–16)
BASOPHILS NFR BLD AUTO: 0.5 %
BUN SERPL-MCNC: 19 MG/DL (ref 7–18)
CALCIUM SERPL-MCNC: 8 MG/DL (ref 8.5–10.1)
CHLORIDE SERPL-SCNC: 98 MMOL/L (ref 98–107)
CO2 SERPL-SCNC: 34 MMOL/L (ref 21–32)
CREAT SERPL-MCNC: 0.9 MG/DL (ref 0.6–1.3)
EOSINOPHIL NFR BLD: 3.2 %
GLUCOSE SERPL-MCNC: 110 MG/DL (ref 70–99)
GRANULOCYTES NFR BLD MANUAL: 76.9 %
HCT VFR BLD CALC: 28.2 % (ref 37–47)
HGB BLD-MCNC: 9.1 GM/DL (ref 12–15)
LYMPHOCYTES # BLD: 1.2 THOU/UL (ref 0.8–5.3)
LYMPHOCYTES NFR BLD AUTO: 12.8 %
MCH RBC QN AUTO: 22.5 PG (ref 26–34)
MCHC RBC AUTO-ENTMCNC: 32.2 G/DL (ref 28–37)
MCV RBC: 69.8 FL (ref 80–100)
MICROCYTES: (no result)
MONOCYTES NFR BLD: 6.6 %
MPV: 6.8 FL. (ref 7.2–11.1)
NEUTROPHILS # BLD: 7.4 THOU/UL (ref 1.6–8.1)
NUCLEATED RBCS: 0 /100WBC
PLATELET # BLD EST: ADEQUATE 10*3/UL
PLATELET COUNT*: 312 THOU/UL (ref 150–400)
POLYCHROMASIA BLD QL SMEAR: (no result)
POTASSIUM SERPL-SCNC: 4.1 MMOL/L (ref 3.5–5.1)
RBC # BLD AUTO: 4.04 MIL/UL (ref 4.2–5)
RDW-CV: 19.3 % (ref 10.5–14.5)
SODIUM SERPL-SCNC: 136 MMOL/L (ref 136–145)
WBC # BLD AUTO: 9.7 THOU/UL (ref 4–11)

## 2020-06-09 NOTE — NUR
ASSUMED PT CARE AT APPROX 1930. PT IS AWAKE AND ORIENTED X4. ASSESSMENT DONE
AND CHARTED. PT C/O OF LEFT LEG PAIN RELIEVED BY PAIN MEDICATION GIVEN PER
MAR. PT REFUSED SOME TURNS,EDUCATION GIVEN AND PT AGREED TO TURN TO SIDES. NO
ACUTE CHANGES OVERNIGHT. CALL LIGHT WITHIN REACH. HOURLY ROUNDING DONE FOR PT
SAFETY. HIGH FALL PRECAUTIONS IN PLACE.

## 2020-06-09 NOTE — NUR
PATIENT HAS BEEN ON BEDREST ALL DAY BUT SHE HAS BEEN TURNED AND REPOSITIONED
EVERY 2 HOURS. HER HEELS HAVE BEEN OFFLOADED WITH THE USE OF PILLOWS TODAY.
ALL MEDICATIONS HAVE BEEN ADMINISTERED AND HER WOUND CARE WAS DONE AS ORDERED.
SHE IS ALERT AND ORIENTED X 4. SHE HAS BEEN TALKING TO HER  THROUGHOUT
THE DAY TO UPDATE HIM ON HER CONDITION. NO C/O PAIN TODAY. SHE IS CURRENTLY
EATING SUPPER. CALL LIGHT IN EASY REACH SIDE RAILS UP X 2 BED IN THE LOW
POSITION. SHE IS A FALL RISK AND FALL PRECAUTIONS HAVE BEEN MAINTAINED. AIKEN
CARE WAS PREFORMED AS ORDERED. LARGE OUTPUT FROM THE AIKEN TODAY.

## 2020-06-09 NOTE — CON
[address]  [address]                    CONSULTATION                  
_______________________________________________________________________________
 
Name:       JAYMIEGRISELDA M             Room:           [address] [place]  
M.R.#:  [id number]      Account #:      [id number]  
Admission:  06/07/20     Attend Phys:    Jair Porras MD 
Discharge:               Date of Birth:  [birth year]  
         Report #: [id number]
                                                                     [id number]  
_______________________________________________________________________________
THIS REPORT FOR:  //name//                      
 
cc:  Quentin Rice Russell J. DO                                               ~
THIS REPORT FOR:  //name//                      
 
CC: Jair Rice
 
DATE OF SERVICE:  06/08/2020
 
 
INFECTIOUS DISEASE CONSULTATION
 
ATTENDING PHYSICIAN:  Jair Porras MD
 
REASON FOR EVALUATION:  Evaluation of possible deep seated infection involving
her left ankle, foot.  The patient has recent history of at least 2
hospitalizations for infectious-related complications, decubitus ulcers.
 
HISTORY OF PRESENT ILLNESS:  Chart for patient examined.  This is a 59-year-old
known to myself with extensive medical history including diabetes mellitus type
2, morbid obesity, who is really essentially nonambulatory I saw in latter part
of April.  At that point, had noted infected wounds involving her left heel with
growth of group A strep.  She was discharged around 04/27, treated with
parenteral therapy with ceftriaxone.  She was unable to make her followup
appointments either to myself or [facility] and more recently has had
issues with the home health and noted worsening changes involving her left
ankle.  There is a question of Charcot.  Due to concerns, she was reevaluated in
the Emergency Room.  Imaging raised question of possible septic arthritis.  She
had a borderline elevated lactic acid of 2.0.  Blood cultures were collected and
are sterile thus far.  Orthopedic Surgery evaluation is pending, empirically
started on therapy with vancomycin as well as ceftriaxone.  She is generally
lucid.  Denies significant pulmonary or gastrointestinal related complaints. 
She has been afebrile.
 
ALLERGIES:  LISTED TO CODEINE.
 
CURRENT MEDICINES:  Include hydrochlorothiazide, losartan, bupropion,
amlodipine, metformin, furosemide, pantoprazole, vancomycin, enoxaparin,
gabapentin, hydrocodone.
 
PAST MEDICAL HISTORY:  Diabetes mellitus type 2, anxiety, morbid obesity, venous
stasis insufficiency of lower extremities, lymphedema, longstanding left ischial
decubitus ulcer.
 
 
 
 
[place] [address]                    CONSULTATION                  
_______________________________________________________________________________
 
Name:       GRISELDA COON             Room:           [address]#:  [id number]      Account #:      [id number]  
Admission:  06/07/20     Attend Phys:    Jair Porras MD 
Discharge:               Date of Birth:  [birth year]  
         Report #: [id number]
                                                                     [id number]  
_______________________________________________________________________________
SOCIAL HISTORY:  Former smoker.  No ethanol.  No illicit drug use.
 
FAMILY HISTORY:  Noncontributory.
 
REVIEW OF SYSTEMS:  Otherwise, unremarkable 10-point review of systems.
 
PHYSICAL EXAMINATION:
GENERAL:  Mild-to-moderate distress.  She is lucid, appears somewhat ill,
morbidly obese, likely undernourished.
VITAL SIGNS:  Temperature 97.7, pulse 89, respirations 16, blood pressure
125/58.
SKIN:  Warm, dry, no rashes.
HEENT:  Normocephalic.  Extraocular muscles intact.
NECK:  Supple.  She is lying in left lateral decubitus position.
LUNGS:  Diminished breath sounds.
HEART:  Regular.  I do not appreciate a murmur.
ABDOMEN:  Large pannus that is hanging to the left side.  There is no apparent
tenderness.
EXTREMITIES:  There is a left ischial decubitus ulcer, probes roughly 9 cm. 
Moderate amount of surface inflammation.  The opening itself is quite small. 
Per the wound care nurse, we just rasped the ankle wound and notes a significant
amount of slough at the base.  She has got bogginess.  There is a moderate
degree of inflammation.  There are photographs available in the chart.
 
LABORATORY AND X-RAY DATA:  Blood cultures sterile thus far.  Prealbumin of
14.6.  Electrolytes:  Sodium 138, potassium 4.1, chloride 102, bicarbonate is
33, anion gap of 3, BUN and creatinine 20 and 0.9, estimated GFR 64.  CBC: 
White count of 10.7, H and H 9.3 and 29.5, platelets of 294.  Glucose has been
as well controlled.  Lactic acid 2.0.  Urinalysis unremarkable.  Ankle imaging
as noted above.  Albumin of 2.4, total protein 8.8.  Estimated LFTs
unremarkable.
 
ASSESSMENT AND PLAN:  Left ankle deep seated infection.  We will need diagnostic
evaluation and that will be attempted aspiration.  We will await Orthopedic
Surgery recommendations.  Continue empiric antimicrobial therapy.  In addition
to the vancomycin, we will add cefepime.  Continue wound care as prescribed. 
She is certainly at risk for other infectious complications.  We will add
incentive spirometry.  Monitor expectantly.
 
 
 
 
 
 
<ELECTRONICALLY SIGNED>
                                        By:  John Melgar MD           
06/09/20     0755
D: 06/08/20 1109_______________________________________
T: 06/08/20 1133Joraimundo Melgar MD              /nt

## 2020-06-09 NOTE — NUR
SW met with pt to follow up on dc planning and resources.  Pt open to any
possibility but wants LTC to be a last resort.  Pt is willing to have SW send
referrals to check on dc options.  SW provided information on applying for
disability; pt thinks she might not qualify because of not really having
worked 5 of the last 10 years and that is a requirement.  pt hopeful for an
AFO that may help her ankle?  pt realistic about transportation issue and the
need for more care at home than what she has available.  SW to continue to
follow.

## 2020-06-09 NOTE — NUR
CALLED PHARMACY AND REPORTED THE VANC TROUGH RESULT OF 23. I WAS TOLD NOT TO
GIVE THE 1230 DOSE AT THIS TIME. THEY ARE LOOKING INTO THE DOSAGE.

## 2020-06-10 VITALS — SYSTOLIC BLOOD PRESSURE: 111 MMHG | DIASTOLIC BLOOD PRESSURE: 58 MMHG

## 2020-06-10 VITALS — SYSTOLIC BLOOD PRESSURE: 132 MMHG | DIASTOLIC BLOOD PRESSURE: 67 MMHG

## 2020-06-10 VITALS — SYSTOLIC BLOOD PRESSURE: 102 MMHG | DIASTOLIC BLOOD PRESSURE: 46 MMHG

## 2020-06-10 VITALS — SYSTOLIC BLOOD PRESSURE: 128 MMHG | DIASTOLIC BLOOD PRESSURE: 73 MMHG

## 2020-06-10 PROCEDURE — 0JBR0ZZ EXCISION OF LEFT FOOT SUBCUTANEOUS TISSUE AND FASCIA, OPEN APPROACH: ICD-10-PCS | Performed by: PODIATRIST

## 2020-06-10 NOTE — NUR
ORTHO RETURNED MY CALL AND STATED THAT THEY WOULD GET SOMEONE OVER HER TO SEE
THE PATIENT. HE SAID THERE IS NOTHING TO DO ACUTELY.

## 2020-06-10 NOTE — NUR
PATIENT HAS BEEN WATCHING TV AND SURFING THE WEB ON HER PHONE. WOUND CARE WAS
HERE AND ORTHO FOR THE CONSULT. DR BALBUENA WAS HERE AND PREFORMED A DEBRIDEMENT
OF THE LEFT LATERAL ANKLE THEN PLACED A DRESSING OVER IT AND ORTHO PLACED A
SPLINT ON THE LEG. PATIENT IS GOING TO BE NPO AFTER MIDNIGHT FOR AN CLOSED VS
OPEN REDUCTION WITH POSSIBLE PINNING OF THE LEFT ANKLE. XRAY TO THE LEFT ANKLE
WAS DONE AND PATIENT WAS TRANSFERRED TO A BARIATRIC BED TODAY ALSO. I
MEDICATED HER FOR PAIN AFTER THE PROCEDURE AND SHE IS RESTING AT THIS TIME.
DRESSING TO LEFT BUTTOCK WAS DONE AS ORDERED. NO DISTRESS TODAY.

## 2020-06-10 NOTE — NUR
PT ALERT AND ORIENTED. BEDREST. Q2 TURN. MEDS GIVEN PER EMAR. PAIN MED GIVEN
X1 THIS SHIFT. WOUND DRESSINGS C/D/I. FALL PRECAUTION IN PLACE. AIKEN FOR
VOIDING. WILL CONTINUE TO MONITOR.

## 2020-06-11 VITALS — SYSTOLIC BLOOD PRESSURE: 114 MMHG | DIASTOLIC BLOOD PRESSURE: 67 MMHG

## 2020-06-11 VITALS — DIASTOLIC BLOOD PRESSURE: 53 MMHG | SYSTOLIC BLOOD PRESSURE: 104 MMHG

## 2020-06-11 VITALS — DIASTOLIC BLOOD PRESSURE: 67 MMHG | SYSTOLIC BLOOD PRESSURE: 114 MMHG

## 2020-06-11 VITALS — DIASTOLIC BLOOD PRESSURE: 65 MMHG | SYSTOLIC BLOOD PRESSURE: 106 MMHG

## 2020-06-11 LAB
%HYPO/RBC NFR BLD AUTO: (no result) %
ABSOLUTE BASOPHILS: 0 THOU/UL (ref 0–0.2)
ABSOLUTE EOSINOPHILS: 0.3 THOU/UL (ref 0–0.7)
ABSOLUTE MONOCYTES: 0.6 THOU/UL (ref 0–1.2)
ANION GAP SERPL CALC-SCNC: 2 MMOL/L (ref 7–16)
ANISOCYTOSIS BLD QL SMEAR: (no result)
BASOPHILS NFR BLD AUTO: 0.4 %
BUN SERPL-MCNC: 23 MG/DL (ref 7–18)
CALCIUM SERPL-MCNC: 8.2 MG/DL (ref 8.5–10.1)
CHLORIDE SERPL-SCNC: 99 MMOL/L (ref 98–107)
CO2 SERPL-SCNC: 37 MMOL/L (ref 21–32)
CREAT SERPL-MCNC: 1 MG/DL (ref 0.6–1.3)
EOSINOPHIL NFR BLD: 3.7 %
GLUCOSE SERPL-MCNC: 131 MG/DL (ref 70–99)
GRANULOCYTES NFR BLD MANUAL: 67.8 %
HCT VFR BLD CALC: 29.9 % (ref 37–47)
HGB BLD-MCNC: 9.4 GM/DL (ref 12–15)
LYMPHOCYTES # BLD: 1.7 THOU/UL (ref 0.8–5.3)
LYMPHOCYTES NFR BLD AUTO: 20.3 %
MCH RBC QN AUTO: 22.2 PG (ref 26–34)
MCHC RBC AUTO-ENTMCNC: 31.4 G/DL (ref 28–37)
MCV RBC: 70.8 FL (ref 80–100)
MICROCYTES: (no result)
MONOCYTES NFR BLD: 7.8 %
MPV: 7 FL. (ref 7.2–11.1)
NEUTROPHILS # BLD: 5.5 THOU/UL (ref 1.6–8.1)
NUCLEATED RBCS: 0 /100WBC
PLATELET # BLD EST: ADEQUATE 10*3/UL
PLATELET COUNT*: 339 THOU/UL (ref 150–400)
POTASSIUM SERPL-SCNC: 3.8 MMOL/L (ref 3.5–5.1)
RBC # BLD AUTO: 4.22 MIL/UL (ref 4.2–5)
RDW-CV: 18.8 % (ref 10.5–14.5)
SODIUM SERPL-SCNC: 138 MMOL/L (ref 136–145)
WBC # BLD AUTO: 8.1 THOU/UL (ref 4–11)

## 2020-06-11 PROCEDURE — 0SSG3ZZ REPOSITION LEFT ANKLE JOINT, PERCUTANEOUS APPROACH: ICD-10-PCS | Performed by: ORTHOPAEDIC SURGERY

## 2020-06-11 PROCEDURE — 3E1U38Z IRRIGATION OF JOINTS USING IRRIGATING SUBSTANCE, PERCUTANEOUS APPROACH: ICD-10-PCS | Performed by: ORTHOPAEDIC SURGERY

## 2020-06-11 NOTE — NUR
PT ALERT AND ORIENTED. PT SLEPT WELL THIS HSIFT. Q2 TURN. MEDS GIVEN PER EMAR.
SPECIAL BED IN PLACE. AIKEN FOR VOIDING. Q2 TURN. NPO AFTER MIDNIGHT FOR
SURGERY TODAY. CONSENT SIGNED AND IN THE CHART, FALL PRECAUTION IN PLACE. CALL
LIGHT WITHIN REACH. WILL CONTINUE TO MONITOR.

## 2020-06-11 NOTE — NUR
PT TRANSFERRED TO PRE-OP PER BED.  C/O NEW R-FA IV HURTING HER A LOT, AND
THERE IS NOTED A SITE OF ERYTHEMA DISTAL/LATERAL TO THE IV SITE INSERTION
WHICH IS NOT RED AT ALL.  J-LOOP WAS TAPED TIGHT IN THAT AREA, SO LOOSENED AND
RE-TAPED AWAY FROM THAT A BIT AND DEMARCATION LINE NOTED WITH EDNA FOR
FUTURE ASSESSMENT.  WARM BLANKET PLACED AROUND SITE.  IV FLUSHES VERY WELL,
AND DRAWS BACK BLOOD.  IT WAS PAINFUL TO FLUSH INITIALLY, BUT MUCH BETTER NOW.

## 2020-06-11 NOTE — NUR
PATIENT NPO FOR ORTHO SUGERY THIS AM, PATIENT RETURNED THIS AFTERNOON WITH NO
DIFFICULTY NOTED. LEFT LEG DRESSING INTACT. PRN HYDROCODONE GIVEN PER MAR
ORDERS. IV RESTARTED BY INFUSION THIS AM, SCHED ABX INFUSED. DR. THURSTON ON
FLOOR AND NOTIFIED OF CRITICAL VANC TROUGH, ORDERS FROM PHARMACY WHO DOSES
RECEIVED. PATIENT IN CONTACT PRECAUTIONS FOR MRSA.

## 2020-06-11 NOTE — NUR
Pt to have surgery today.  SW faxed initial referral to [facility] with pt
permission to check on possibilities as needed at [place].  SW to continue to
follow.

## 2020-06-12 VITALS — DIASTOLIC BLOOD PRESSURE: 44 MMHG | SYSTOLIC BLOOD PRESSURE: 91 MMHG

## 2020-06-12 VITALS — DIASTOLIC BLOOD PRESSURE: 55 MMHG | SYSTOLIC BLOOD PRESSURE: 105 MMHG

## 2020-06-12 VITALS — SYSTOLIC BLOOD PRESSURE: 101 MMHG | DIASTOLIC BLOOD PRESSURE: 52 MMHG

## 2020-06-12 VITALS — DIASTOLIC BLOOD PRESSURE: 46 MMHG | SYSTOLIC BLOOD PRESSURE: 98 MMHG

## 2020-06-12 VITALS — SYSTOLIC BLOOD PRESSURE: 99 MMHG | DIASTOLIC BLOOD PRESSURE: 36 MMHG

## 2020-06-12 VITALS — DIASTOLIC BLOOD PRESSURE: 61 MMHG | SYSTOLIC BLOOD PRESSURE: 103 MMHG

## 2020-06-12 LAB
ANION GAP SERPL CALC-SCNC: 0 MMOL/L (ref 7–16)
BUN SERPL-MCNC: 26 MG/DL (ref 7–18)
CALCIUM SERPL-MCNC: 8 MG/DL (ref 8.5–10.1)
CHLORIDE SERPL-SCNC: 99 MMOL/L (ref 98–107)
CO2 SERPL-SCNC: 36 MMOL/L (ref 21–32)
CREAT SERPL-MCNC: 1.3 MG/DL (ref 0.6–1.3)
GLUCOSE SERPL-MCNC: 108 MG/DL (ref 70–99)
HCT VFR BLD CALC: 28.8 % (ref 37–47)
HGB BLD-MCNC: 8.9 GM/DL (ref 12–15)
MAGNESIUM SERPL-MCNC: 2.1 MG/DL (ref 1.8–2.4)
MCH RBC QN AUTO: 21.8 PG (ref 26–34)
MCHC RBC AUTO-ENTMCNC: 30.8 G/DL (ref 28–37)
MCV RBC: 70.9 FL (ref 80–100)
MPV: 7.2 FL. (ref 7.2–11.1)
PLATELET COUNT*: 363 THOU/UL (ref 150–400)
POTASSIUM SERPL-SCNC: 4.5 MMOL/L (ref 3.5–5.1)
RBC # BLD AUTO: 4.06 MIL/UL (ref 4.2–5)
RDW-CV: 19.1 % (ref 10.5–14.5)
SODIUM SERPL-SCNC: 135 MMOL/L (ref 136–145)
WBC # BLD AUTO: 11.5 THOU/UL (ref 4–11)

## 2020-06-12 NOTE — NUR
DC plans pending inpt rehab consult and decision on acceptance and insurance
auth.  SW to continue to follow to assist with safe dc planning.

## 2020-06-12 NOTE — NUR
ASSUMED CARE FROM PREVIOUS SHIFT , PT REFUSING CAPNO AND SAT MONITOR , DR LOERA NOTIFED PT ALERT ORIENTED X4 PT TALKING ON CELLPHONE SAT 94 -96 %. 02
REMAIN AT 6L NC. PT RESTED WELL THROUGHOUT HOURLY ROUNDS. PT ALSO REFUSED TO
HAVE DRESSING CHANGED ON BUTTOCK

## 2020-06-12 NOTE — NUR
EMMANUEL FROM WOUND CARE THIS AFTERNOON AND DRESSING CHANGED TO LEFT LEG, PHOTOS
TAKEN. DRESSING TO LEFT BUTTOCK CHANGED PER PROTOCOL. PRN HYDROCODONE GIVEN X
1 THIS SHIFT FOR LEFT ANKLE PAIN. LEFT HAND IV NOT FLUSHING THIS AFTERNOON,
NEW IV STARTED BY SERVANDO IN INFUSION TO RIGHT FOREARM. TURNED Q2. REMAINS IN
CONTACT PRECAUTIONS.

## 2020-06-12 NOTE — NUR
WOUND NURSE:  PATIENT SEEN FOR DRESSING CHANGE TO LEFT LATERAL MALLEOLUS AND
PLANTAR HEEL POSTOPERATIVE WOUNDS. REMOVED SPLINT AND DRESSING AND CLEANSED
WITH WOUND CLEANSER AND GAUZE, APPLIED SMALL AMOUNT OF SKIN PREP TO PERIWOUND
TO ANCHOR AQUACEL AG, THEN COVERED WITH ABD, THEN WRAPPED WITH KERLEX ROLL
GAUZE UNDER 4 ROLLS OF COTTON CAST PADDING APPLIED TOES TO KNEE. REAPPLIED
SPLINT, THEN WRAPPED TOES TO KNEE USING 2 - 6 INCH ACE WRAPS. PATIENT STARTED
KICKING HER FOOT AFTER SPLINT WAS REMOVED BECAUSE OF DISCOMFORT WHILE ANOTHER
NURSE WAS MANAGING HER IV.  PATIENT WAS INSTRUCTED NOT TO KICK AND FLAIL HER
LEFT LEG WHEN DRESSING IS BEING REPLACED.  PATIENT HAS SMALL INCISION ON
LATERAL MALLEOLUS AND PLANTAR FOOT WITH INTACT SUTURES IN PLACE AND EDGES WELL
APPROXIMATED.   THERE IS MINIMAL
PERIWOUND REDNESS, WARMTH, OR INDURATION NOTED.

## 2020-06-13 VITALS — SYSTOLIC BLOOD PRESSURE: 136 MMHG | DIASTOLIC BLOOD PRESSURE: 67 MMHG

## 2020-06-13 VITALS — DIASTOLIC BLOOD PRESSURE: 65 MMHG | SYSTOLIC BLOOD PRESSURE: 125 MMHG

## 2020-06-13 VITALS — SYSTOLIC BLOOD PRESSURE: 103 MMHG | DIASTOLIC BLOOD PRESSURE: 61 MMHG

## 2020-06-13 LAB
ANION GAP SERPL CALC-SCNC: 2 MMOL/L (ref 7–16)
BUN SERPL-MCNC: 29 MG/DL (ref 7–18)
CALCIUM SERPL-MCNC: 8.1 MG/DL (ref 8.5–10.1)
CHLORIDE SERPL-SCNC: 100 MMOL/L (ref 98–107)
CO2 SERPL-SCNC: 35 MMOL/L (ref 21–32)
CREAT SERPL-MCNC: 1.1 MG/DL (ref 0.6–1.3)
GLUCOSE SERPL-MCNC: 128 MG/DL (ref 70–99)
MAGNESIUM SERPL-MCNC: 2 MG/DL (ref 1.8–2.4)
POTASSIUM SERPL-SCNC: 4.2 MMOL/L (ref 3.5–5.1)
SODIUM SERPL-SCNC: 137 MMOL/L (ref 136–145)

## 2020-06-13 NOTE — NUR
PATIENT SLEPT MOST OF THE NIGHT. IV REMAINS SALINE LOCKED. IV VANC WAS GIVEN
AS ORDERED. DRESSING TO LEFT FOOT REMAINS INTACT. PATIENT WAS GIVEN PAIN
MEDICINE ONCE THIS SHIFT. WILL CONTINUE TO MONITOR.

## 2020-06-14 VITALS — DIASTOLIC BLOOD PRESSURE: 61 MMHG | SYSTOLIC BLOOD PRESSURE: 115 MMHG

## 2020-06-14 VITALS — SYSTOLIC BLOOD PRESSURE: 122 MMHG | DIASTOLIC BLOOD PRESSURE: 61 MMHG

## 2020-06-14 NOTE — NUR
PATIENT SLEPT MOST OF THE NIGHT. IV REMAINS SALINE LOCKED. IV VANC WAS GIVEN
AS ORDERED. DRESSING TO LEFT BUTTOCK WAS CHANGED. DRESSING TO LEFT FOOT
REMAINS INTACT. PATIENT WAS GIVEN PAIN MEDICINE ONCE THIS SHIFT. WILL CONTINUE
TO MONITOR. 6

## 2020-06-14 NOTE — NUR
PATIENT RESTING IN BED. PATIENT HAS COMPLAINTS OF LEFT LEG PAIN, TREATED
ADEQUATELY WITH MEDICATION. PATIENT REPOSITIONED WHILE IN BED. PATIENT HAS
AIKEN CATHETER AND USES BEDPAN. PATIENT HAS EXCELLENT APPETITE. PATIEN TIS NWB
TO LLE. PATIENT DENIES ANY NEEDS AT THIS TIME. CALL LIGHT WITHIN REACH.

## 2020-06-15 VITALS — DIASTOLIC BLOOD PRESSURE: 49 MMHG | SYSTOLIC BLOOD PRESSURE: 108 MMHG

## 2020-06-15 VITALS — DIASTOLIC BLOOD PRESSURE: 54 MMHG | SYSTOLIC BLOOD PRESSURE: 121 MMHG

## 2020-06-15 VITALS — DIASTOLIC BLOOD PRESSURE: 69 MMHG | SYSTOLIC BLOOD PRESSURE: 125 MMHG

## 2020-06-15 LAB
ANION GAP SERPL CALC-SCNC: 1 MMOL/L (ref 7–16)
BUN SERPL-MCNC: 20 MG/DL (ref 7–18)
CALCIUM SERPL-MCNC: 8.9 MG/DL (ref 8.5–10.1)
CHLORIDE SERPL-SCNC: 100 MMOL/L (ref 98–107)
CO2 SERPL-SCNC: 37 MMOL/L (ref 21–32)
CREAT SERPL-MCNC: 0.9 MG/DL (ref 0.6–1.3)
GLUCOSE SERPL-MCNC: 84 MG/DL (ref 70–99)
MAGNESIUM SERPL-MCNC: 1.9 MG/DL (ref 1.8–2.4)
POTASSIUM SERPL-SCNC: 4.2 MMOL/L (ref 3.5–5.1)
SODIUM SERPL-SCNC: 138 MMOL/L (ref 136–145)

## 2020-06-15 PROCEDURE — 05HY33Z INSERTION OF INFUSION DEVICE INTO UPPER VEIN, PERCUTANEOUS APPROACH: ICD-10-PCS | Performed by: INTERNAL MEDICINE

## 2020-06-15 NOTE — NUR
SW continuing to follow.  Inpt rehab considering pt but SW also sent referral
to [facility] inpt rehab as another option if [facility] unable to accept for
any reason.  SW to check on status of referral and assist with finalizing safe
dc plan.  pt dc barriers: no benefits for snf or ltac.  Will be awaiting inpt
rehab decision and then insurance auth.

## 2020-06-15 NOTE — NUR
PATIENT SLEPT MOST OF THE NIGHT. IV VANC WAS GIVEN AS ORDERED. PATIENT WAS
GIVEN PAIN MEDICINE TWICE THIS SHIFT. AIKEN REMAINS TO DEPENDENT DRAIN.
PATIENT IS POSSIBLY DISCHARGING TO REHAB OR SKILLED FACILTY TODAY OR TOMORROW.
WILL CONTINUE TO MONITOR.

## 2020-06-15 NOTE — NUR
NO COMPLAINTS OF PAIN THIS SHIFT. TURNED Q2. DRESSING TO LEFT BUTTOCK CHANGED
PER ORDERS. AIKEN CONTINUES DRAINING LARGE AMOUNTS OF CLEAR YELLOW URINE. PICC
LINE PLACED TODAY PER ORDERS, RIGHT UPPER ARM. FLUSHING WITHOUT DIFFCULTY AND
GOOD BLOOD RETURN NOTED. Q12 VANC RECEIVED. 02 2L NC REMAINS IN PLACE. CONTACT
PRECAUTIONS REMAIN. DRESSING TO LEFT LEG C/D/I.

## 2020-06-15 NOTE — NUR
RIGHT CEPHALIC VESSEL ACCESSED FOR SINGLE LUMEN POWER PICC.  LINE PRE-TRIMMED
TO 42CM AND ADVANCED TO THE ZERO DARIEN WITH NO RESISTANCE MET.  UPPER ARM
CIRCUMFERENCE ABOVE INSERTION SITE = 14 1/2".  SHERLOCK AND 3CG CONFIRMATION
OF TIP TERMINATION AT THE CAVOATRIAL JUNCTION APPRECIATED.  GUIDE WIRE
REMOVED, LINE FLUSHED AND INSERTION SITE DRESSED. REPORT GIVEN TO LYDIA HEBERT.

## 2020-06-16 VITALS — SYSTOLIC BLOOD PRESSURE: 110 MMHG | DIASTOLIC BLOOD PRESSURE: 61 MMHG

## 2020-06-16 VITALS — DIASTOLIC BLOOD PRESSURE: 85 MMHG | SYSTOLIC BLOOD PRESSURE: 158 MMHG

## 2020-06-16 VITALS — DIASTOLIC BLOOD PRESSURE: 58 MMHG | SYSTOLIC BLOOD PRESSURE: 110 MMHG

## 2020-06-16 NOTE — NUR
CM INFORMED BY STEVAN AT [facility] PT WOULD NOT BE ACCEPTED FOR ACUTE REHAB.
 
PT INOFORMED BY CM, PT STATES [facility] HAD ALREADY CALLED TO INFORM HER. PT
PROVIDED A LIST OF LTC FACILITES. PT STATES SHE CANNOT AFFORD TO PAY OUT OF
POCKET BUT ACCEPTED THE LIST. PT WILLING TO TRY TO COMPLETE MEDICAID XAVIER, AS
SHE HAD BEEN IN THE PROCESS IN THE PAST, BUT DID NOT FOLLOW THRU B/C SHE DIDNT
BELIEVE SHE WOULD QUALIFY.
 
EUGENIO CLEVELANDT JOAN FERNANDO [id number] W/ [employer] TO COMPLETE MEDICAID ASSESSMENT/XAVIER
W/PT.
 
INPT REHAB TO CONT TO FOLLOW. CM TO CONT TO FOLLOW.

## 2020-06-16 NOTE — NUR
PT ALERT AND ORIENTED. VSS ON 2L. MEDS GIVEN PER EMAR. Q2 TURN. PT WOULD
OCCASSIONALLY REFUSE TURNS. ISOLATION PRECAUTION IN PLACE. CALL LIGHT WITHIN
REACH. HOURLY ROUNDINGS MADE. WILL CONTINUE TO MONITOR.

## 2020-06-16 NOTE — NUR
PT A&OX4 VSS. PT EVALUATED FOR REHAB. PT UP TO RECLINER THIS AFTERNOON
W/KAREN. PO PAIN MEDS ADMINISTERED PRIOR TO PT WORKING W/
WOUND DRESSING CHANGED BY EMMANUEL HEBERT, PHOTOS TO CHART. PT ON 2L 02 BY
NC. PT ACCUCHECK IN AM ONLY. PICC TO RUE PATENT, FLUSHES EASILY. AIKEN
CATHETER PATENT, YELLOW URINE VISIBLE IN COLLECTION BAG. PT REMAINS IN ISO R/T
MRSA IN WOUNDS. PT IS Q2H TURNS, BUT REFUSES INTERMITTENTLY. PT ABLE TO ASSIST
WITH MOVEMENT IN BED. IV ABX CONTINUED. PT DGTR AT BEDSIDE THIS AFTERNOON. PT
RESTS IN BED AT THIS TIME W/CALL LIGHT IN REACH. WILL CONTINUE TO MONITOR.

## 2020-06-16 NOTE — NUR
cm left  for Ashley at [facility] to f/u on referral. [phone].
Also, reached out to [facility] to f/u re: inpatient rehab. Waiting to hear
back.

## 2020-06-16 NOTE — NUR
WOUND NURSE:  PATIENT SEEN FOR FOLLOW UP ASSESSMENT PERTAINING TO LEFT FOOT
POSTOPERATIVE LESIONS.  LEFT LATERAL ANKLE MEASURES 0.1 X 3.0 X 0.1 CM, CLOSED
WITH NO DRAINAGE, SCANT AMOUNT OF DRIED YELLOW DRAINAGE ON OLD DRESSING.  NO
PERIWOUND REDNESS, WARMTH, OR INDURATION. TWO INTACT STAPLES IN PLACE.
PLANTAR FOOT (HEEL) CLOSED NO SUTURES, NO DRAINAGE.  DRIED YELLOW DRAINAGE ON
OLD DRESSING.  NO REDNESS, WARMTH, OR INDURATION NOTED.  CLEANSED WITH WOUND
CLEANSER AND GAUZE.  APPLIED SKIN PREP SPARINGLY TO PERIWOUND.  APPLIED
AQUACEL AG UNDER ABD, WRAPPED WITH KERLEX ROLL GAUZE UNDER COTTON CAST
PADDING.  REAPPLIED SPLINT, SECURED SPLINT IN PLACE WITH 2 6 INCH ACE WRAPS.
PATIENT TOLERATED WELL.  REINSTRUCTED TO KEEP IMMOBILIZED AND UP ON PILLOWS.
PATIENT STATES SHE UNDERSTANDS.

## 2020-06-17 VITALS — DIASTOLIC BLOOD PRESSURE: 81 MMHG | SYSTOLIC BLOOD PRESSURE: 151 MMHG

## 2020-06-17 VITALS — DIASTOLIC BLOOD PRESSURE: 75 MMHG | SYSTOLIC BLOOD PRESSURE: 131 MMHG

## 2020-06-17 NOTE — NUR
[facility] inpt rehab might reconsider for acceptance but will be pending acceptance
and insurance auth.  Again, pt does not have benefits for SNF or Ltac so pt
would be pending a LTC placement if pt not able to dc home yet with family
care.  Pt received list of LTCs yesterday and [facility] to assess pt for
possible Medicaid pending.  SW to continue to follow to assist with safe dc
planning.

## 2020-06-17 NOTE — CON
[address]  [address]                    CONSULTATION                  
_______________________________________________________________________________
 
Name:       JAYMIEGRISELDA M             Room:           [address] [place]  
.R.#:  [id number]      Account #:      [id number]  
Admission:  06/07/20     Attend Phys:    Jair Porras MD 
Discharge:               Date of Birth:  [birth year]  
         Report #: [id number]
                                                                     [id number]  
_______________________________________________________________________________
THIS REPORT FOR:  //name//                      
 
cc:  Quentin Rice Russell J. DO                                               ~
THIS REPORT FOR:  //name//                      
 
CC: Jair Rice
 
DATE OF SERVICE:  06/10/2020
 
 
CHIEF COMPLAINT:  Followup of ulceration to the left fibular malleolus with
subacute Charcot ankle dislocation.  Wound swab culture growing Staphylococcus
aureus, she is on parenteral vancomycin and cefepime.  She has been afebrile
with good appetite, low-grade pain to the area.  She is nonambulatory, venous
Doppler negative for DVT.
 
LABORATORY DATA:  WBC 9.7, RBC 4.04, hemoglobin 9.1, hematocrit 28.2, and
platelets 312.  BUN 19, creatinine 0.9, and glucose 110.
 
PHYSICAL EXAMINATION:  Large ulceration to the left distal fibular malleolus,
roughly 2.5 x 3.0 x 0.2 cm.  The wound bed has a pale yellow fibrotic bed with
no granulation.  There is advanced inflammation surrounding the wound extending
to the distal lateral ankle.  There is no underlying fluctuance or crepitation. 
The inflammation is localized to the fibular malleolus and adjacent hindfoot and
ankle, with relative involvement of the anterior and medial aspect of the ankle
joint.  She has advanced lymphedema with dislocated tibiotalar joint.  The talus
is extruded distal medially with a fairly flaccid foot.
 
I reviewed x-rays, which are negative for osteolysis to the talar dome and
tibial plafond and lateral fibula.  She does have pretty typical findings of a
Charcot fracture dislocation with fragmentation of the talus as well as hind and
mid foot joints.  There is no subcutaneous emphysema or opaque densities within
the ankle articulation.
 
IMPRESSION:  Soft tissue ulceration, left lateral fibular malleolus with Charcot
fracture dislocation of the ankle, and involvement of the hindfoot.  Type 2
diabetes mellitus, lymphedema, and morbid obesity.
 
PLAN:  I performed an excisional ulcer debridement with #10 blade to excise
subcutaneous tissue from the entire wound bed.  This tissue was sent for aerobic
and anaerobic tissue culture.  Bleeding was stopped with pressure and the wound
was cleansed and dressed with Aquacel Ag, ABD, Kerlix and the orthopedic
resident placed a stirrup ankle splint with the foot in a relatively rectus
alignment.  X-rays will be taken after splint application.  From a wound care
 
 
 
[place] [address]                    CONSULTATION                  
_______________________________________________________________________________
 
Name:       GRISELDA COON             Room:           [address] [place]  
M.R.#:  [id number]      Account #:      [id number]  
Admission:  06/07/20     Attend Phys:    Jair Porras MD 
Discharge:               Date of Birth:  [birth year]  
         Report #: [id number]
                                                                     [id number]  
_______________________________________________________________________________
standpoint, the patient may benefit from a wound vacuum down the road or
continue with topical wound therapy.  I will defer to Orthopedics regarding any
possible ORIF in the future.
 
 
 
 
 
 
 
 
 
 
 
 
 
 
 
 
 
 
 
 
 
 
 
 
 
 
 
 
 
 
 
 
 
 
 
 
 
 
 
 
 
<ELECTRONICALLY SIGNED>
                                        By:  Flaco Armstrong DPM          
06/17/20     1307
D: 06/10/20 1541_______________________________________
T: 06/10/20 2145Flaco Armstrong DPM             /nt

## 2020-06-17 NOTE — NUR
PT A&OX4 VSS. PT IS ACCUCHECK IN AM ONLY, METFORMIN ADMINISTERED AS ORDERED.
O2 DC'D W/RT AS PT WAS ABLE TO MAINTAIN ADEQUATE SATS ON ROOM AIR. PICC TO RUE
PATENT, SALINE LOCKED. DRESSING C/D/I. PT REMAINS CONTINENT OF B/B AND
REQUESTS BEDPAN AS NEEDED.  CASE MANAGEMENT CONTINUES TO WORK ON
PLACEMENT/DC PLAN FOR THIS PT. PT IS KAREN FOR TRANSFERS. PT HAS AIKEN
CARTHETER, YELLOW URINE VISIBLE IN COLLECTION BAG. PT RESTS IN BED WITH CALL
LIGHT IN REACH. WILL CONTINUE TO MONITOR.

## 2020-06-17 NOTE — NUR
PT SLEPT WELL OVERNIGHT. TURNED Q2 HOURS AND PRN FOR COMFORT AND SKIN CARE, PT
OCC REFUSING TURNS, ABLE TO READJUST POSITION SOMEWHAT ON HER OWN. REMAINS ON
SPECIALTY BED, REFUSING SCD. AIKEN DRAINING YELLOW URINE. BM PER BEDPAN THIS
SHIFT. ROSALVA PICC ABX GIVEN AS ORDERED. O2 2L NC. LLE DRSG CDI, ELEVATED ON
PILLOW. NO LABS THIS MORNING. REMAINS ON CONTACT ISOLATION FOR MRSA WOUND.
ABLE TO USE CALL LITE AND MAKE NEEDS KNOWN. CM FOLLOWING FOR DC PLAN.

## 2020-06-17 NOTE — CON
[address]  [address]                    CONSULTATION                  
_______________________________________________________________________________
 
Name:       JAYMIEGRISELDA M             Room:           [address] [place]  
..#:  [id number]      Account #:      [id number]  
Admission:  06/07/20     Attend Phys:    Jair Porras MD 
Discharge:               Date of Birth:  [birth year]  
         Report #: [id number]
                                                                     [id number]  
_______________________________________________________________________________
THIS REPORT FOR:  //name//                      
 
cc:  Quentin Rice Russell J. DO                                               ~
THIS REPORT FOR:  //name//                      
 
CC: Jair Rice
 
DATE OF SERVICE:  06/10/2020
 
 
ADMISSION DIAGNOSES:  Left lateral ankle ulcer, Charcot ankle fracture
dislocation, cellulitis.
 
HISTORY OF PRESENT ILLNESS:  A 59-year-old female admitted with worsening
cellulitis to the right lateral ankle with chronic ulceration of the distal
fibula.  She has morbid obesity with lymphedema and concomitant ischial wounds. 
She was being treated by Dr. Quentin Rice at [facility],
but recently developed a wound to the left lateral fibular malleolus and Charcot
arthropathy.  She was ambulating in an Aircast boot beginning in 12/2019 with
progressive loss of her ability to ambulate.  She notes she is waiting for shoes
cutback/insoles in a brace from [facility].  She is currently on parenteral
vancomycin and cefepime by Dr. John Melgar.  At this time, she is afebrile with
no fevers, chills, nausea, or malaise.  She has moderate pain to palpation to
the ulceration.  X-rays significant for dislocation of the left talus from the
tibial plafond with no evidence of septic arthritis or subcutaneous emphysema. 
Venous Doppler was negative for DVT.  Blood cultures are pending x 2.
 
ALLERGIES:  CODEINE.
 
MEDICATIONS:  Reviewed in H and P.
 
SOCIAL HISTORY:  Lives at home in a trailer with her  and daughter.  She
is a former smoker, quit less than 1 year ago.  She has been nonambulatory for
the last several months and bedbound.
 
PAST MEDICAL HISTORY:  Diabetes mellitus type 2, morbid obesity, lymphedema,
left adrenal adenoma, left ischial decubitus ulceration, hypovitaminosis D,
cholelithiasis.
 
LABORATORY DATA:  WBC 10.7, hemoglobin 9.3, hematocrit 29.5, platelets 294.  BUN
20, creatinine 0.9, glucose 109.
 
PHYSICAL EXAMINATION:  There is a large full-thickness ulceration to the left
fibular malleolus that measures roughly 3.0 x 3.0 x 0.2 cm.  The wound bed is
 
 
 
[place] [address]                    CONSULTATION                  
_______________________________________________________________________________
 
Name:       GRISELDA COON             Room:           [address] [place]  
[facility]#:  [id number]      Account #:      [id number]  
Admission:  06/07/20     Attend Phys:    Jair Porras MD 
Discharge:               Date of Birth:  [birth year]  
         Report #: [id number]
                                                                     [id number]  
_______________________________________________________________________________
covered with yellow fibrotic tissue with no granulation.  There is advanced
inflammation to the area indicative of cellulitis with no underlying fluctuance
or crepitation.  No exposed bone, tendon or joint.  I am unable to express any
drainage from the ulcer and there is no tunneling or undermining.  The left
ankle is rather flaccid and unstable due to tibiotalar dislocation.  She has
advanced lymphedema to both extremities with onychomycosis.  Her feet are warm
with palpable dorsalis pedis pulses.  There is no pallor, cyanosis, or signs of
acute vascular embarrassment.
 
IMPRESSION:  Chronic ulceration, left lateral fibula with cellulitis, Charcot
arthropathy with tibiotalar dislocation, type 2 diabetes mellitus, lymphedema,
morbid obesity.
 
PLAN:  I performed aerobic and anaerobic swab culture of the wound.  I do not
favor any surgical open reduction due to the open wound and cellulitis.  It is
unlikely her talus will be able to be reduced into the ankle mortise given the
degree of dislocation and fragmentation of the talus.  She may require
tibiotalar calcaneal arthrodesis with intramedullary americo once the infection
subsides.  I do not favor any septic arthritis at this point based on the
clinical and radiographic findings.  I do not recommend ankle joint aspiration,
although I will debride the left lateral ankle wound at next visit and see if
the wound communicates to the ankle joint or if there is any underlying
purulence.  I discussed with Tristin wound nurse.  The wound was cleansed and
dressed with Aquacel Ag, ABDs, Kerlix and Ace bandage.  The patient to remain
nonweightbearing to the extremity at present.
 
 
 
 
 
 
 
 
 
 
 
 
 
 
 
 
 
 
 
<ELECTRONICALLY SIGNED>
                                        By:  Flaco Armstrong DPM          
06/17/20     1307
D: 06/10/20 1936_______________________________________
T: 06/10/20 2328Danai Armstrong DPM             /nt

## 2020-06-17 NOTE — CON
[address]  [address]                    CONSULTATION                  
_______________________________________________________________________________
 
Name:       GRISELDA COON             Room:           [address] [place]  
M.R.#:  [id number]      Account #:      [id number]  
Admission:  06/07/20     Attend Phys:    Jair Porras MD 
Discharge:               Date of Birth:  [birth year]  
         Report #: [id number]
                                                                     [id number]  
_______________________________________________________________________________
THIS REPORT FOR:  //name//                      
 
cc:  Quentin Rice Russell J. DO                                               ~
THIS REPORT FOR:  //name//                      
 
CC: Jair Rice
 
DATE OF SERVICE:  06/12/2020
 
 
Status post percutaneous Steinmann pin across the tibiotalar across the
tibiotalar calcaneal joint with reduction of the talus and ankle joint mortise. 
The bandage to the left ankle was changed today by Tristin, the wound care nurse. 
Admission swab cultures grew MRSA, additional soft tissue cultures are pending. 
She is on parenteral vancomycin per ID.  She is awaiting skilled nursing
facility placement.  I do not anticipate further surgical debridement of the
ankle wound.  I will defer to Orthopedics to follow up regarding the wound, at
this point to facilitate patient transportation issues.  I am happy to follow up
with her at [facility] if that is feasible unless Orthopedics
wants to follow her for the wound concomitantly with the closed reduction.
 
 
 
 
 
 
 
 
 
 
 
 
 
 
 
 
 
 
 
 
 
 
<ELECTRONICALLY SIGNED>
                                        By:  Flaco Armstrong DPM          
06/17/20     1307
D: 06/12/20 1631_______________________________________
T: 06/12/20 2037Flaco Armstrong, SHWETA             /nt

## 2020-06-18 VITALS — DIASTOLIC BLOOD PRESSURE: 56 MMHG | SYSTOLIC BLOOD PRESSURE: 122 MMHG

## 2020-06-18 VITALS — DIASTOLIC BLOOD PRESSURE: 101 MMHG | SYSTOLIC BLOOD PRESSURE: 158 MMHG

## 2020-06-18 NOTE — NUR
PATIENT SLEPT MOST OF THE NIGHT. IV VANC WAS GIVEN AS ORDERED. PATIENT WAS
GIVEN PAIN MEDICINE ONCE THIS SHIFT. DRESSING TO LEFT BUTTOCK WAS CHANGED THIS
SHIFT AND REMAINS INTACT. WILL CONTINUE TO MONITOR.

## 2020-06-18 NOTE — NUR
Pt under reconsideration for inpt rehab; SW to speak with pt and pt family
again to discuss possiblities for pt care at home after inpt rehab. SW will
follow with LTC as back up plan if/when needed as well.

## 2020-06-18 NOTE — OP
[address]  [address]                    OPERATIVE REPORT              
_______________________________________________________________________________
 
Name:       JAYMIEGRISELDA             Room:           [address] [place]  
[facility]#:  [id number]      Account #:      [id number]  
Admission:  06/07/20     Attend Phys:    Jair Porras MD 
Discharge:               Date of Birth:  [birth year]  
         Report #: [id number]
                                                                     [id number]  
_______________________________________________________________________________
THIS REPORT FOR:  //name//                      
 
cc:  Quentin Rice Russell J. DO                                               ~
THIS REPORT FOR:  //name//                      
 
CC: Jair Rice
 
DICTATED BY: Mansoor Bobo DO
 
PREOPERATIVE DIAGNOSES:
1.  Left tibiotalar and subtalar joint dislocation.
2.  Left Charcot foot arthropathy.
 
POSTOPERATIVE DIAGNOSES:
1.  Left tibiotalar and subtalar joint dislocation.
2.  Left Charcot foot arthropathy.
 
PROCEDURE:
1.  Closed reduction and percutaneous pinning of the left tibiotalar and
subtalar joints.
2.  Irrigation and debridement and closure of chronic left lateral ankle wound.
3.  Physician directed fluoroscopy less than 1 hour.
 
SURGEON:  Mendez Martinez DO
 
ASSISTANTS:  Mansoor Bobo DO and Dino Prather DO
 
ANESTHESIA TYPE:  General.
 
ESTIMATED BLOOD LOSS:  Minimal.
 
SPECIMENS REMOVED:  None.
 
COMPLICATIONS:  None.
 
CONDITION:  Stable to PACU.
 
ANTIBIOTICS:  Scheduled cefepime.
 
INDICATION FOR PROCEDURE:  The patient is a 59-year-old female with multiple
comorbidities including diabetes, neuropathy, and Charcot arthropathy.  She was
found to have a dislocated tibiotalar and subtalar joint on x-rays including
findings consistent with Charcot arthropathy.  She has been nonambulatory for
the last couple of months.  Prior to then, she was walking on the lateral aspect
 
 
 
[place] [address]                    OPERATIVE REPORT              
_______________________________________________________________________________
 
Name:       GRISELDA COON             Room:           [address] [place]  
[facility]#:  [id number]      Account #:      [id number]  
Admission:  06/07/20     Attend Phys:    Jair Porras MD 
Discharge:               Date of Birth:  [birth year]  
         Report #: [id number]
                                                                     [id number]  
_______________________________________________________________________________
of her ankle and developed a chronic wound.  We discussed a closed reduction and
percutaneous pinning to stabilize the ankle and allow her wound to heal.  The
risks, benefits, alternatives and possible complications including but not
limited to DVT, PE, infection, need for repeat surgery, possible need for
amputation in the future, pin site irritation, rhabdomyolysis and possible
anesthesia complications were reviewed.  She voices understanding and agreeable
to proceed.
 
DESCRIPTION OF PROCEDURE:  The patient was met in the preoperative area.  The
correct site was marked.  She was transferred to the operative suite and placed
supine on the operative table.  She was given the benefit of general anesthesia.
 A well-padded tourniquet was applied to the left upper leg, but was not used
for the case.  The leg was then prepped and draped in the normal sterile
fashion.  A timeout was performed to identify the correct patient, procedure and
operative site.  All in the room were in agreement.  She is on scheduled
antibiotics.  We began by using fluoroscopy to identify our starting point for
the Steinmann pins.  We performed a reduction maneuver to improve the alignment
of the tibiotalar and subtalar joints.  We then advanced the 3.2 mm Steinmann
pin starting from the plantar aspect of the calcaneus percutaneously.  We then
advanced this into the talus.  We then used the pin to manipulate the joints to
improve alignment and advanced this into the tibia.  Appropriate reduction and
placement of the pin was confirmed on fluoroscopy.  A second 3.2 mm Steinmann
pin was then placed more medially.  We took final pictures to confirm pin
placement and adequate reduction.  Final images were saved.  The Steinmann pins
were then cut at the level of the skin and then tamped in further to bury below
the skin.  We then performed an irrigation and debridement of her lateral
chronic ankle wound.  We then loosely reapproximated this with interrupted 3-0
nylon sutures x 2.  We then applied a sterile dressing including Xeroform, 4 x
4s, and ABD and Kerlix.  We then placed a well-padded posterior slab splint. 
She was extubated and anesthesia was reversed by the anesthesia team.  She was
taken to the PACU in stable condition.  All needle and sponge counts were
correct x 2 at the end of the case.
 
ATTESTATION:  Dr. Martinez was present throughout all critical aspects of the
case.
 
POSTOPERATIVE CARE:  We will reconsult wound care.  It is okay to remove the
splint for wound care as needed.  This can be reapplied.  After wound care is
performed, she will be strict nonweightbearing on the left lower extremity.
 
 
 
 
 
<ELECTRONICALLY SIGNED>
                                        By:  Mendez Martinez DO       
06/18/20     1626
D: 06/11/20 1248_______________________________________
T: 06/11/20 1304Mendez Martinez DO          /nt

## 2020-06-18 NOTE — NUR
PATIENT RESTING UP IN CHAIR. PATIENT IS UP WITH KAREN LIFT. PATIENT WORKED
WITH THERAPIES TODAY. PATIENT REFUSED DRESSING CHANGE TO LEFT LEG TODAY.
PATIENT HAS GOOD APPETITE. PATIENT HAS PAIN TO BUTTOCK AND LEFT LEG, TREATED
PARTIALLY WITH MEDICATION AND REST. PATIENT DENIES ANY NEEDS AT THIS TIME.
CALL LIGHT WITHIN REACH.

## 2020-06-18 NOTE — NUR
WOUND NURSE: PLANNED TO SEE PATIENT TO ASSESS BUTTOCK WOUND, BUT PATIENT
SITTING IN UP IN WHEELCHAIR.  AGREED TO COME BACK TOMORROW FOR WOUND
ASSESSMENT.

## 2020-06-19 VITALS — DIASTOLIC BLOOD PRESSURE: 52 MMHG | SYSTOLIC BLOOD PRESSURE: 115 MMHG

## 2020-06-19 VITALS — DIASTOLIC BLOOD PRESSURE: 78 MMHG | SYSTOLIC BLOOD PRESSURE: 155 MMHG

## 2020-06-19 LAB
ALBUMIN SERPL-MCNC: 2.9 G/DL (ref 3.4–5)
ALP SERPL-CCNC: 116 U/L (ref 46–116)
ALT SERPL-CCNC: 20 U/L (ref 30–65)
ANION GAP SERPL CALC-SCNC: 6 MMOL/L (ref 7–16)
AST SERPL-CCNC: 14 U/L (ref 15–37)
BILIRUB SERPL-MCNC: 0.3 MG/DL
BUN SERPL-MCNC: 26 MG/DL (ref 7–18)
CALCIUM SERPL-MCNC: 8.9 MG/DL (ref 8.5–10.1)
CHLORIDE SERPL-SCNC: 100 MMOL/L (ref 98–107)
CO2 SERPL-SCNC: 31 MMOL/L (ref 21–32)
CREAT SERPL-MCNC: 1 MG/DL (ref 0.6–1.3)
GLUCOSE SERPL-MCNC: 93 MG/DL (ref 70–99)
POTASSIUM SERPL-SCNC: 4.2 MMOL/L (ref 3.5–5.1)
PROT SERPL-MCNC: 8.8 G/DL (ref 6.4–8.2)
SODIUM SERPL-SCNC: 137 MMOL/L (ref 136–145)

## 2020-06-19 NOTE — NUR
PATIENT SLEPT MOST OF THE NIGHT. IV VANC WAS GIVEN AS ORDERED. PATIENT WAS
GIVEN PAIN MEDICINE TWICE THIS SHIFT WITH SOME RELIEF. STILL AWAITING
PLACEMENT FOR REHAB. WILL CONTINUE TO MONITOR.

## 2020-06-19 NOTE — NUR
DC pending option for acceptance from inpt rehab and insurance auth.  Human
arc to follow up with pt for Medicaid pending option.  SW to continue to
follow to assist with finalizing safe dc plan and placement.

## 2020-06-19 NOTE — NUR
PATIENT RESTING IN BED. PATIENT WAS UP TO WHEELCHAIR THIS AM WITH THEWRAPY.
PATIENT IS UP WITH KAREN LIFT. NON-WEIGHT BEARING TO LLE. PATIENT HAD DRESSING
TO BUTTOCK AND LEFT FOOT THIS EVENING. PATIENT HAS GOOD APPETITE. PATIENT
DENIES ANY NEEDS AT THIS TIME. CALL LIGHT WITHIN REACH.

## 2020-06-20 VITALS — SYSTOLIC BLOOD PRESSURE: 110 MMHG | DIASTOLIC BLOOD PRESSURE: 62 MMHG

## 2020-06-20 VITALS — SYSTOLIC BLOOD PRESSURE: 116 MMHG | DIASTOLIC BLOOD PRESSURE: 64 MMHG

## 2020-06-20 VITALS — DIASTOLIC BLOOD PRESSURE: 77 MMHG | SYSTOLIC BLOOD PRESSURE: 137 MMHG

## 2020-06-20 LAB
ABSOLUTE BASOPHILS: 0 THOU/UL (ref 0–0.2)
ABSOLUTE EOSINOPHILS: 0.3 THOU/UL (ref 0–0.7)
ABSOLUTE MONOCYTES: 0.6 THOU/UL (ref 0–1.2)
ALBUMIN SERPL-MCNC: 2.6 G/DL (ref 3.4–5)
ALP SERPL-CCNC: 122 U/L (ref 46–116)
ALT SERPL-CCNC: 30 U/L (ref 30–65)
ANION GAP SERPL CALC-SCNC: 6 MMOL/L (ref 7–16)
AST SERPL-CCNC: 23 U/L (ref 15–37)
BASOPHILS NFR BLD AUTO: 0.6 %
BILIRUB SERPL-MCNC: 0.3 MG/DL
BUN SERPL-MCNC: 26 MG/DL (ref 7–18)
CALCIUM SERPL-MCNC: 8.5 MG/DL (ref 8.5–10.1)
CHLORIDE SERPL-SCNC: 101 MMOL/L (ref 98–107)
CO2 SERPL-SCNC: 32 MMOL/L (ref 21–32)
CREAT SERPL-MCNC: 1.1 MG/DL (ref 0.6–1.3)
EOSINOPHIL NFR BLD: 3 %
GLUCOSE SERPL-MCNC: 121 MG/DL (ref 70–99)
GRANULOCYTES NFR BLD MANUAL: 70.7 %
HCT VFR BLD CALC: 31.8 % (ref 37–47)
HGB BLD-MCNC: 10 GM/DL (ref 12–15)
LYMPHOCYTES # BLD: 1.7 THOU/UL (ref 0.8–5.3)
LYMPHOCYTES NFR BLD AUTO: 18.8 %
MAGNESIUM SERPL-MCNC: 1.7 MG/DL (ref 1.8–2.4)
MCH RBC QN AUTO: 22.1 PG (ref 26–34)
MCHC RBC AUTO-ENTMCNC: 31.6 G/DL (ref 28–37)
MCV RBC: 69.9 FL (ref 80–100)
MONOCYTES NFR BLD: 6.9 %
MPV: 6.8 FL. (ref 7.2–11.1)
NEUTROPHILS # BLD: 6.3 THOU/UL (ref 1.6–8.1)
NUCLEATED RBCS: 0 /100WBC
PLATELET COUNT*: 300 THOU/UL (ref 150–400)
POTASSIUM SERPL-SCNC: 3.9 MMOL/L (ref 3.5–5.1)
PROT SERPL-MCNC: 8.3 G/DL (ref 6.4–8.2)
RBC # BLD AUTO: 4.54 MIL/UL (ref 4.2–5)
RDW-CV: 20.7 % (ref 10.5–14.5)
SODIUM SERPL-SCNC: 139 MMOL/L (ref 136–145)
WBC # BLD AUTO: 8.9 THOU/UL (ref 4–11)

## 2020-06-20 NOTE — NUR
PATIENT'S DAUGHTER CAME UP AND BROUGHT HER SOME SNACKS AND TACO PIKE. SHE HAS
BEEN RESTING IN THE BED MOST OF THE DAY. PT CAME AND GOT HER UP IN THE
WHEELCHAIR AND TOOK HER OUT OF THE ROOM. SHE SAT UP FOR ABOUT AN HOUR AND A
HALF. WE PUT HER BACK TO BED WITH THE LIFT AND I DID THE TREATMENT TO HER LEFT
BUTTOCK. SHE HAD A LARGE BOWEL MOVEMENT AND WAS CLEANED UP AND IS EATING AT
THIS TIME. SHE HAS NOT REQUIRED ANY PAIN MEDICATION THIS SHIFT.

## 2020-06-20 NOTE — NUR
PT SLEPT WELL OVERNIGHT. USING BED JOYCE FOR BM THIS SHIFT. AIKEN DRAINING
YELLOW URINE. LLE DRSG CDI. BUTTOCK DRSG CDI. REMAINS ON LOW AIR LOSS
SPECIALTY BED. ROSALVA PICC SL, ABX GIVEN AS ORDERED. REMAINS ON CONTACT
ISOLATION. PT TURNED AND REPOSITIONED Q2 HOURS AND PRN FOR SKIN CARE AND
COMFORT AS SHE WOULD ALLOW AND/OR REQUEST. AM LABS. ABLE TO USE CALL LITE AND
MAKE NEEDS KNOWN. CM FOLLOWING FOR DC PLAN.

## 2020-06-21 VITALS — DIASTOLIC BLOOD PRESSURE: 62 MMHG | SYSTOLIC BLOOD PRESSURE: 130 MMHG

## 2020-06-21 VITALS — SYSTOLIC BLOOD PRESSURE: 131 MMHG | DIASTOLIC BLOOD PRESSURE: 69 MMHG

## 2020-06-21 VITALS — DIASTOLIC BLOOD PRESSURE: 63 MMHG | SYSTOLIC BLOOD PRESSURE: 139 MMHG

## 2020-06-21 NOTE — NUR
PT A&OX4 VSS. PT IN SPECIALTY BED R/T SKIN ISSUES. DRESSING TO L BUTTOCK
CHANGED THIS SHIFT. PICC TO RUE PATENT, DRESSING C/D/I. PT HAS AIKEN CATHETER
IN PLACE. DEVICE SECURED AND YELLOW URINE VISIBLE IN COLLECTION BAG. PT
TRANSFERS W/KAREN TO AND FROM RECLINER. PT INCONTINENT OF BOWELS WHILE UP IN
CHAIR. PT ABLE TO CALL FOR BEDPAN WHILE IN BED. IV ABX CONTINUED. PT WAITS FOR
DC PLAN/PLACEMENT FOR DC. FAMILY AT BEDSIDE THIS AFTERNOON. PT RESTS IN BED
WITH CALL LIGHT IN REACH. WILL CONTINUE TO MONITOR.

## 2020-06-21 NOTE — NUR
PT HAS SLEPT WELL OVERNIGHT AFTER RECEIVING PO PAIN MED AT HS FOR CO BUTTOCK
AND LLE PAIN. ACE WRAP SPLINT REMAINS CDI TO LLE. ROSALVA PICC SL, ABX GIVEN AS
ORDERED. AIKEN DRAINING CLEAR YELLOW URINE. PT TURNED AND REPOSITIONED Q2
HOURS AND PRN FOR SKIN CARE AND COMFORT AS SHE WOULD ALLOW. ON SPECIALTY LOW
AIR LOSS PASTORA BED. PO MAG GIVEN AT HS, AM LABS. USING BEDPAN FOR BM THIS
SHIFT. ABLE TO USE CALL LITE AND MAKE NEEDS KNOWN.

## 2020-06-22 VITALS — DIASTOLIC BLOOD PRESSURE: 77 MMHG | SYSTOLIC BLOOD PRESSURE: 136 MMHG

## 2020-06-22 VITALS — SYSTOLIC BLOOD PRESSURE: 115 MMHG | DIASTOLIC BLOOD PRESSURE: 65 MMHG

## 2020-06-22 VITALS — SYSTOLIC BLOOD PRESSURE: 133 MMHG | DIASTOLIC BLOOD PRESSURE: 78 MMHG

## 2020-06-22 LAB
ALBUMIN SERPL-MCNC: 2.7 G/DL (ref 3.4–5)
ALP SERPL-CCNC: 112 U/L (ref 46–116)
ALT SERPL-CCNC: 21 U/L (ref 30–65)
ANION GAP SERPL CALC-SCNC: 5 MMOL/L (ref 7–16)
AST SERPL-CCNC: 14 U/L (ref 15–37)
BILIRUB SERPL-MCNC: 0.3 MG/DL
BUN SERPL-MCNC: 20 MG/DL (ref 7–18)
CALCIUM SERPL-MCNC: 8.4 MG/DL (ref 8.5–10.1)
CHLORIDE SERPL-SCNC: 103 MMOL/L (ref 98–107)
CO2 SERPL-SCNC: 32 MMOL/L (ref 21–32)
CREAT SERPL-MCNC: 0.8 MG/DL (ref 0.6–1.3)
GLUCOSE SERPL-MCNC: 92 MG/DL (ref 70–99)
HCT VFR BLD CALC: 32 % (ref 37–47)
HGB BLD-MCNC: 10 GM/DL (ref 12–15)
MAGNESIUM SERPL-MCNC: 1.7 MG/DL (ref 1.8–2.4)
MCH RBC QN AUTO: 22.4 PG (ref 26–34)
MCHC RBC AUTO-ENTMCNC: 31.4 G/DL (ref 28–37)
MCV RBC: 71.2 FL (ref 80–100)
MPV: 7.2 FL. (ref 7.2–11.1)
PLATELET COUNT*: 288 THOU/UL (ref 150–400)
POTASSIUM SERPL-SCNC: 3.9 MMOL/L (ref 3.5–5.1)
PROT SERPL-MCNC: 8.5 G/DL (ref 6.4–8.2)
RBC # BLD AUTO: 4.49 MIL/UL (ref 4.2–5)
RDW-CV: 20.8 % (ref 10.5–14.5)
SODIUM SERPL-SCNC: 140 MMOL/L (ref 136–145)
WBC # BLD AUTO: 8.3 THOU/UL (ref 4–11)

## 2020-06-22 NOTE — NUR
SPOKE WITH KASSIE FROM [facility]. PATIENT WILL BE A NEW REFERRAL TO
DETERMINE MEDICAID ELIGIBILITY. KASSIE TO FOLLOW UP WITH CM TO LET US KNOW
STATUS FOR PATIENT.
 
CM TO CONTINUE TO FOLLOW FOR SAFE DISCHARGE PLANNING

## 2020-06-22 NOTE — NUR
PT SLEPT OFF AND ON OVERNIGHT. ROSALVA PICC SL, ABX GIVEN AS ORDERED. DRSG TO LLE
CHANGED THIS SHIFT, REMAINS CDI THIS MORNING.BUTTOCK DRSG CDI.AM LABS. PO PAIN
MED GIVEN ONCE THIS SHIFT FOR LLE PAIN WITH GOOD RESULT. REMAINS ON SPECIALTY
LOW AIR LOSS BED, TURNED AND REPOSITIONED Q4 HOURS AND PRN AS PT WOULD ALLOW.
ABLE TO USE CALL LITE AND MAKE NEEDS KNOWN. REMAINS ON CONTACT ISOLATION FOR
[facility].

## 2020-06-22 NOTE — NUR
PT A&OX4 VSS. PT REMAINED IN BED THIS SHIFT. PICC TO RUE PATENT, DRESSING
C/D/I. LOCKED WHEN ABX NOT INFUSING. PT REMAINS CONTINENT OF B/B.  PT
CONTINUES TO WAIT FOR CASE MGMNT TO MAKE PROGRESS IN DC PLAN. PT REMAINS NWB
TO LLE PER [facility]. PT ACCUCHECK, AM ONLY. NO INSULIN INDICATED. AIKEN CATHETER
PATENT, YELLOW URINE VISIBLE IN COLLECTION BAG. P[T DID NOT REQUEST PAIN
MEDICATION THIS SHIFT. PT RESTS IN ROOM WITH CALL LIGHT IN REACH. WILL
CONTINUE TO MONITOR.

## 2020-06-22 NOTE — NUR
SW was informed that pt will be non weight bearing status for 3 months and
while inpt will still reconsider possibility for pt to be admitted to inpt
rehab for a while, pt would need dc plan towards increased assist at home,
issue of stairs, and/or LTC placement.  Pt was unavailable at this time and SW
tried to call pt dtr with no answer; SW left voicemail message.  Still pending
answer on whether or not pt will be able to be Medicaid pending; this would be
helpful in inpt rehab decision to accept as well since this will help with
possibility of LTC placement.

## 2020-06-23 VITALS — SYSTOLIC BLOOD PRESSURE: 138 MMHG | DIASTOLIC BLOOD PRESSURE: 78 MMHG

## 2020-06-23 VITALS — SYSTOLIC BLOOD PRESSURE: 131 MMHG | DIASTOLIC BLOOD PRESSURE: 65 MMHG

## 2020-06-23 VITALS — DIASTOLIC BLOOD PRESSURE: 66 MMHG | SYSTOLIC BLOOD PRESSURE: 136 MMHG

## 2020-06-23 NOTE — NUR
SW received call from Kym, rehab liaison, stating that pt is denied
inpt rehab.  SW met with pt to discuss dc planning and informed pt of
doctor stating medical readiness to dc.  Pt aware of not qualifying for
Medicaid and reasoning provided was that pt didn't have qualifying disability.
Pt said again that she didn't qualify for disability because she didn't work 5
out of the last 10 years.  SW discussed need for determining what pt would
need to dc home since pt unable to afford LTC option. Pt discussed possibly
hiring some care but worries that is not enough and complained about her dtr
not wanting to help provide for her care.  SW discussed dc needs with  Dr Magaña and HAMLET Gallegos such as pt equipment for home, HH, etc.  SW to follow up
again with pt and pt dtr to discuss possible family training.

## 2020-06-23 NOTE — NUR
PATIENT SLEPT MOST OF THE NIGHT. PICC LINE REMAINS TO ROSALVA AND WORKING WELL.
DRESSING TO L BUTTOCK WAS CHANGED. LEFT ANKLE DRESSING/SPLINT REMAINS INTACT.
PATIENT DID NOT ASK FOR ANY PAIN MEDS THIS SHIFT. AIKEN REMAINS TO DEPENDENT
DRAIN. WILL CONTINUE TO MONITOR.

## 2020-06-23 NOTE — NUR
PATIENT WOUND TO COCCYX AND LEFT LEG CHANGED PER PROTOCOL. DR. TURNER FROM
[facility] NOTIFIED THAT PATIENT IS SUPPOSED TO DISCHARGE HOME TOMORROW AND SUTURES
TO LEFT ANKLE REMAIN IN PLACE, NO NEW ORDERS RECEIVED ABOUT DISCONTINUING
SUTURES AND PATIENT TO FOLLOW UP IN 1 WEEK FOR REMOVAL. PICC LINE DRESSING
CHANGED PER PROTOCOL, SCHED VANC INFUSED AS ORDERED. PATIENT REFUSING TO TURN,
EDUCATION GIVEN. PRN HYDROCODONE GIVEN X 2 FOR PAIN. REMAINS IN CONTACT
PRECAUTIONS.

## 2020-06-24 VITALS — SYSTOLIC BLOOD PRESSURE: 140 MMHG | DIASTOLIC BLOOD PRESSURE: 77 MMHG

## 2020-06-24 VITALS — DIASTOLIC BLOOD PRESSURE: 77 MMHG | SYSTOLIC BLOOD PRESSURE: 140 MMHG

## 2020-06-24 NOTE — NUR
GARY met with pt to discuss dc plan for today; pt to dc home with dtr and assist
and HH services and pt decided she would agree to accepting ella and wc after
all.  Pt confident HH services will be able to help train her and her family.
Pt to dc home by ambulance; SW to follow up to ensure of DME and HH following
pt and to discuss transportation resources beyond ambulance/fire dept
assisting pt in and out of house and then wc van or other transport providing
rides to appts.
GARY faxed referral and dc summary to [facility].

## 2020-06-24 NOTE — NUR
PT TO DISCHARGE HOME PER AMBULANCE. AIKEN CATHETER AND PICC LINE IN PLACE AND
FUNCTIONING. PT WILL LEAVE WITH THEM PER DR SPEARS.  COPY OF DSCHARGE
PAPERWORK TO PT WITH EXPLAINATION. PT VERBALIZED UNDERSTANDING. PRESCRIPTION
FOR PAIN MEDICATION GIVEN TO PT. DOXYCYCLINE CALLED INTO PHARMACY BY DR FALLON.

## 2020-06-24 NOTE — NUR
PATIENT SLEPT MOST OF THE NIGHT. IV VANC WAS GIVEN AS ORDERED. AIKEN REMAINS
TO DEPENDENT DRAIN. PATIENT IS SUPPOSED TO BE DISCHARGED HOME TODAY. WILL
CONTINUE TO MONITOR.

## 2020-08-04 ENCOUNTER — HOSPITAL ENCOUNTER (INPATIENT)
Dept: HOSPITAL 96 - M.ERS | Age: 60
Discharge: HOME | DRG: 560 | End: 2020-08-04
Attending: INTERNAL MEDICINE | Admitting: INTERNAL MEDICINE
Payer: COMMERCIAL

## 2020-08-04 VITALS — SYSTOLIC BLOOD PRESSURE: 133 MMHG | DIASTOLIC BLOOD PRESSURE: 73 MMHG

## 2020-08-04 VITALS — HEIGHT: 64 IN | BODY MASS INDEX: 50.02 KG/M2 | WEIGHT: 293 LBS

## 2020-08-04 VITALS — DIASTOLIC BLOOD PRESSURE: 82 MMHG | SYSTOLIC BLOOD PRESSURE: 145 MMHG

## 2020-08-04 DIAGNOSIS — Z79.899: ICD-10-CM

## 2020-08-04 DIAGNOSIS — Z91.14: ICD-10-CM

## 2020-08-04 DIAGNOSIS — E66.01: ICD-10-CM

## 2020-08-04 DIAGNOSIS — Z79.84: ICD-10-CM

## 2020-08-04 DIAGNOSIS — F17.210: ICD-10-CM

## 2020-08-04 DIAGNOSIS — F41.9: ICD-10-CM
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
## 2020-08-04 DIAGNOSIS — T84.197A: Primary | ICD-10-CM
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
## 2020-08-04 DIAGNOSIS — I89.0: ICD-10-CM

## 2020-08-04 DIAGNOSIS — Z03.818: ICD-10-CM

## 2020-08-04 DIAGNOSIS — E11.610: ICD-10-CM

## 2020-08-04 LAB
%HYPO/RBC NFR BLD AUTO: (no result) %
ABSOLUTE EOSINOPHILS: 0.5 THOU/UL (ref 0–0.7)
ABSOLUTE MONOCYTES: 1 THOU/UL (ref 0–1.2)
ALBUMIN SERPL-MCNC: 3 G/DL (ref 3.4–5)
ALP SERPL-CCNC: 129 U/L (ref 46–116)
ALT SERPL-CCNC: 29 U/L (ref 30–65)
ANION GAP SERPL CALC-SCNC: 5 MMOL/L (ref 7–16)
ANISOCYTOSIS BLD QL SMEAR: (no result)
AST SERPL-CCNC: 16 U/L (ref 15–37)
BE: 5.5 MMOL/L
BILIRUB SERPL-MCNC: 0.3 MG/DL
BUN SERPL-MCNC: 23 MG/DL (ref 7–18)
CALCIUM SERPL-MCNC: 8.7 MG/DL (ref 8.5–10.1)
CHLORIDE SERPL-SCNC: 100 MMOL/L (ref 98–107)
CO2 SERPL-SCNC: 35 MMOL/L (ref 21–32)
CREAT SERPL-MCNC: 0.9 MG/DL (ref 0.6–1.3)
EOSINOPHIL NFR BLD: 4 %
GLUCOSE SERPL-MCNC: 113 MG/DL (ref 70–99)
GRANULOCYTES NFR BLD MANUAL: 55 %
HCT VFR BLD CALC: 39.8 % (ref 37–47)
HGB BLD-MCNC: 12.8 GM/DL (ref 12–15)
LYMPHOCYTES # BLD: 3.9 THOU/UL (ref 0.8–5.3)
LYMPHOCYTES NFR BLD AUTO: 33 %
MCH RBC QN AUTO: 23.1 PG (ref 26–34)
MCHC RBC AUTO-ENTMCNC: 32.3 G/DL (ref 28–37)
MCV RBC: 71.3 FL (ref 80–100)
MICROCYTES: (no result)
MONOCYTES NFR BLD: 8 %
MPV: 7.6 FL. (ref 7.2–11.1)
NEUTROPHILS # BLD: 6.5 THOU/UL (ref 1.6–8.1)
NUCLEATED RBCS: 0 /100WBC
PCO2 BLD: 54.1 MMHG (ref 35–45)
PLATELET # BLD EST: ADEQUATE 10*3/UL
PLATELET COUNT*: 330 THOU/UL (ref 150–400)
PO2 BLD: 144.9 MMHG (ref 75–100)
POLYCHROMASIA BLD QL SMEAR: (no result)
POTASSIUM SERPL-SCNC: 3.7 MMOL/L (ref 3.5–5.1)
PROT SERPL-MCNC: 8.2 G/DL (ref 6.4–8.2)
RBC # BLD AUTO: 5.57 MIL/UL (ref 4.2–5)
RDW-CV: 21.4 % (ref 10.5–14.5)
SODIUM SERPL-SCNC: 140 MMOL/L (ref 136–145)
WBC # BLD AUTO: 11.9 THOU/UL (ref 4–11)

## 2020-08-05 NOTE — EKG
[place] [address]
Phone:  [phone]                     ELECTROCARDIOGRAM REPORT      
_______________________________________________________________________________
 
Name:         JAYMIEGRISELDA            Room:          [address] [place] 
..#:    [id number]     Account #:     [id number]  
Admission:    20    Attend Phys:   Jair Porras, 
Discharge:    20    Date of Birth: [birth year]  
Date of Service: 20 1554  Report #:      [id number]
        [id number]
_______________________________________________________________________________
THIS REPORT FOR:  //name//                      
 
                         [facility] ED
                                       
Test Date:    2020               Test Time:    15:54:33
Pat Name:     GRISELDA COON         Department:   
Patient ID:   [id number]            Room:         [facility]
Gender:       F                        Technician:   SHANTEL
:          1960               Requested By: Ary Virk
Order Number: [id number] MD:   Kyle Goldstein
                                 Measurements
Intervals                              Axis          
Rate:         93                       P:            42
CO:           142                      QRS:          96
QRSD:         88                       T:            40
QT:           374                                    
QTc:          466                                    
                           Interpretive Statements
Sinus rhythm
rsr' in V1
Borderline right axis deviation
Borderline T abnormalities, anterior leads
Compared to ECG 2020 03:07:49
no change
Electronically Signed On 2020 13:46:14 CDT by Kyle Goldstein
[email or web address]
 
 
 
 
 
 
 
 
 
 
 
 
 
 
 
 
 
 
  <ELECTRONICALLY SIGNED>
                                           By: Kyle Goldstein MD, FAC      
  20     1346
D: 20 1554   _____________________________________
T: 20 1554   Kyle Goldstein MD, FAC        /EPI